# Patient Record
Sex: FEMALE | Race: ASIAN | Employment: PART TIME | URBAN - METROPOLITAN AREA
[De-identification: names, ages, dates, MRNs, and addresses within clinical notes are randomized per-mention and may not be internally consistent; named-entity substitution may affect disease eponyms.]

---

## 2021-07-31 ENCOUNTER — HOSPITAL ENCOUNTER (EMERGENCY)
Age: 31
Discharge: HOME | End: 2021-08-01
Attending: EMERGENCY MEDICINE
Payer: COMMERCIAL

## 2021-07-31 VITALS
WEIGHT: 135 LBS | SYSTOLIC BLOOD PRESSURE: 132 MMHG | HEART RATE: 90 BPM | TEMPERATURE: 98.5 F | DIASTOLIC BLOOD PRESSURE: 89 MMHG | OXYGEN SATURATION: 100 % | RESPIRATION RATE: 16 BRPM

## 2021-07-31 DIAGNOSIS — S06.0X1A CONCUSSION WITH LOSS OF CONSCIOUSNESS OF 30 MINUTES OR LESS, INITIAL ENCOUNTER: ICD-10-CM

## 2021-07-31 DIAGNOSIS — S09.90XA CLOSED HEAD INJURY, INITIAL ENCOUNTER: Primary | ICD-10-CM

## 2021-07-31 PROCEDURE — 84703 CHORIONIC GONADOTROPIN ASSAY: CPT

## 2021-07-31 PROCEDURE — 96374 THER/PROPH/DIAG INJ IV PUSH: CPT

## 2021-07-31 PROCEDURE — 6360000002 HC RX W HCPCS: Performed by: EMERGENCY MEDICINE

## 2021-07-31 PROCEDURE — 82077 ASSAY SPEC XCP UR&BREATH IA: CPT

## 2021-07-31 PROCEDURE — 80307 DRUG TEST PRSMV CHEM ANLYZR: CPT

## 2021-07-31 PROCEDURE — 2580000003 HC RX 258: Performed by: EMERGENCY MEDICINE

## 2021-07-31 PROCEDURE — 99283 EMERGENCY DEPT VISIT LOW MDM: CPT

## 2021-07-31 PROCEDURE — 36415 COLL VENOUS BLD VENIPUNCTURE: CPT

## 2021-07-31 PROCEDURE — 80053 COMPREHEN METABOLIC PANEL: CPT

## 2021-07-31 PROCEDURE — 85025 COMPLETE CBC W/AUTO DIFF WBC: CPT

## 2021-07-31 PROCEDURE — 81001 URINALYSIS AUTO W/SCOPE: CPT

## 2021-07-31 RX ORDER — LORAZEPAM 2 MG/ML
1 INJECTION INTRAMUSCULAR ONCE
Status: COMPLETED | OUTPATIENT
Start: 2021-07-31 | End: 2021-07-31

## 2021-07-31 RX ORDER — SODIUM CHLORIDE 9 MG/ML
1000 INJECTION, SOLUTION INTRAVENOUS CONTINUOUS
Status: DISCONTINUED | OUTPATIENT
Start: 2021-07-31 | End: 2021-08-01 | Stop reason: HOSPADM

## 2021-07-31 RX ADMIN — LORAZEPAM 1 MG: 2 INJECTION INTRAMUSCULAR; INTRAVENOUS at 23:56

## 2021-07-31 RX ADMIN — SODIUM CHLORIDE 1000 ML: 9 INJECTION, SOLUTION INTRAVENOUS at 23:55

## 2021-08-01 ENCOUNTER — APPOINTMENT (OUTPATIENT)
Dept: CT IMAGING | Age: 31
End: 2021-08-01
Payer: COMMERCIAL

## 2021-08-01 LAB
A/G RATIO: 1.4 (ref 1.1–2.2)
ALBUMIN SERPL-MCNC: 4.8 G/DL (ref 3.4–5)
ALP BLD-CCNC: 70 U/L (ref 40–129)
ALT SERPL-CCNC: 23 U/L (ref 10–40)
AMPHETAMINE SCREEN, URINE: NORMAL
ANION GAP SERPL CALCULATED.3IONS-SCNC: 13 MMOL/L (ref 3–16)
AST SERPL-CCNC: 23 U/L (ref 15–37)
BACTERIA: ABNORMAL /HPF
BARBITURATE SCREEN URINE: NORMAL
BASOPHILS ABSOLUTE: 0.1 K/UL (ref 0–0.2)
BASOPHILS RELATIVE PERCENT: 0.6 %
BENZODIAZEPINE SCREEN, URINE: NORMAL
BILIRUB SERPL-MCNC: <0.2 MG/DL (ref 0–1)
BILIRUBIN URINE: NEGATIVE
BLOOD, URINE: ABNORMAL
BUN BLDV-MCNC: 11 MG/DL (ref 7–20)
CALCIUM SERPL-MCNC: 9.8 MG/DL (ref 8.3–10.6)
CANNABINOID SCREEN URINE: NORMAL
CHLORIDE BLD-SCNC: 104 MMOL/L (ref 99–110)
CLARITY: CLEAR
CO2: 21 MMOL/L (ref 21–32)
COCAINE METABOLITE SCREEN URINE: NORMAL
COLOR: YELLOW
CREAT SERPL-MCNC: 0.6 MG/DL (ref 0.6–1.1)
EOSINOPHILS ABSOLUTE: 0.2 K/UL (ref 0–0.6)
EOSINOPHILS RELATIVE PERCENT: 1.2 %
EPITHELIAL CELLS, UA: ABNORMAL /HPF (ref 0–5)
ETHANOL: NORMAL MG/DL (ref 0–0.08)
GFR AFRICAN AMERICAN: >60
GFR NON-AFRICAN AMERICAN: >60
GLOBULIN: 3.4 G/DL
GLUCOSE BLD-MCNC: 120 MG/DL (ref 70–99)
GLUCOSE URINE: NEGATIVE MG/DL
HCG QUALITATIVE: NEGATIVE
HCT VFR BLD CALC: 39.6 % (ref 36–48)
HEMOGLOBIN: 13.3 G/DL (ref 12–16)
KETONES, URINE: NEGATIVE MG/DL
LEUKOCYTE ESTERASE, URINE: ABNORMAL
LYMPHOCYTES ABSOLUTE: 1.7 K/UL (ref 1–5.1)
LYMPHOCYTES RELATIVE PERCENT: 12.3 %
Lab: NORMAL
MCH RBC QN AUTO: 28.9 PG (ref 26–34)
MCHC RBC AUTO-ENTMCNC: 33.6 G/DL (ref 31–36)
MCV RBC AUTO: 86 FL (ref 80–100)
METHADONE SCREEN, URINE: NORMAL
MICROSCOPIC EXAMINATION: YES
MONOCYTES ABSOLUTE: 0.6 K/UL (ref 0–1.3)
MONOCYTES RELATIVE PERCENT: 4.3 %
NEUTROPHILS ABSOLUTE: 11.6 K/UL (ref 1.7–7.7)
NEUTROPHILS RELATIVE PERCENT: 81.6 %
NITRITE, URINE: NEGATIVE
OPIATE SCREEN URINE: NORMAL
OXYCODONE URINE: NORMAL
PDW BLD-RTO: 13.2 % (ref 12.4–15.4)
PH UA: 7
PH UA: 7 (ref 5–8)
PHENCYCLIDINE SCREEN URINE: NORMAL
PLATELET # BLD: 332 K/UL (ref 135–450)
PMV BLD AUTO: 8.8 FL (ref 5–10.5)
POTASSIUM REFLEX MAGNESIUM: 4.5 MMOL/L (ref 3.5–5.1)
PROPOXYPHENE SCREEN: NORMAL
PROTEIN UA: NEGATIVE MG/DL
RBC # BLD: 4.61 M/UL (ref 4–5.2)
RBC UA: ABNORMAL /HPF (ref 0–4)
RENAL EPITHELIAL, UA: ABNORMAL /HPF (ref 0–1)
SODIUM BLD-SCNC: 138 MMOL/L (ref 136–145)
SPECIFIC GRAVITY UA: 1.01 (ref 1–1.03)
TOTAL PROTEIN: 8.2 G/DL (ref 6.4–8.2)
URINE TYPE: ABNORMAL
UROBILINOGEN, URINE: 0.2 E.U./DL
WBC # BLD: 14.1 K/UL (ref 4–11)
WBC UA: ABNORMAL /HPF (ref 0–5)

## 2021-08-01 PROCEDURE — 70450 CT HEAD/BRAIN W/O DYE: CPT

## 2021-08-01 PROCEDURE — 6360000002 HC RX W HCPCS: Performed by: EMERGENCY MEDICINE

## 2021-08-01 PROCEDURE — 96375 TX/PRO/DX INJ NEW DRUG ADDON: CPT

## 2021-08-01 PROCEDURE — 6370000000 HC RX 637 (ALT 250 FOR IP): Performed by: EMERGENCY MEDICINE

## 2021-08-01 RX ORDER — ACETAMINOPHEN 500 MG
1000 TABLET ORAL ONCE
Status: COMPLETED | OUTPATIENT
Start: 2021-08-01 | End: 2021-08-01

## 2021-08-01 RX ORDER — PROMETHAZINE HYDROCHLORIDE 25 MG/ML
12.5 INJECTION, SOLUTION INTRAMUSCULAR; INTRAVENOUS ONCE
Status: COMPLETED | OUTPATIENT
Start: 2021-08-01 | End: 2021-08-01

## 2021-08-01 RX ADMIN — ACETAMINOPHEN 1000 MG: 500 TABLET ORAL at 01:48

## 2021-08-01 RX ADMIN — PROMETHAZINE HYDROCHLORIDE 12.5 MG: 25 INJECTION INTRAMUSCULAR; INTRAVENOUS at 01:47

## 2021-08-01 ASSESSMENT — PAIN SCALES - GENERAL: PAINLEVEL_OUTOF10: 8

## 2021-08-01 NOTE — ED NOTES
Per front registration pt was crying at time of arrival but able to use cell phone and hold conversation with family.        Ellie Mayfield RN  07/31/21 4853

## 2021-08-01 NOTE — ED NOTES
Bed: 31  Expected date:   Expected time:   Means of arrival:   Comments:     Inocente Leos RN  07/31/21 4747

## 2021-08-01 NOTE — ED PROVIDER NOTES
Services:     Active Member of Clubs or Organizations:     Attends Club or Organization Meetings:     Marital Status:    Intimate Partner Violence:     Fear of Current or Ex-Partner:     Emotionally Abused:     Physically Abused:     Sexually Abused:      Current Facility-Administered Medications   Medication Dose Route Frequency Provider Last Rate Last Admin    promethazine (PHENERGAN) injection 12.5 mg  12.5 mg Intravenous Once Moris Pastor MD        acetaminophen (TYLENOL) tablet 1,000 mg  1,000 mg Oral Once Moris Pastor MD        0.9 % sodium chloride infusion  1,000 mL Intravenous Continuous Moris Pastor  mL/hr at 07/31/21 2355 1,000 mL at 07/31/21 2355     No current outpatient medications on file. Not on File    REVIEW OF SYSTEMS  10 systems reviewed, pertinent positives per HPI otherwise noted to be negative. PHYSICAL EXAM  /89   Pulse 90   Temp 98.5 °F (36.9 °C)   Resp 16   Wt 135 lb (61.2 kg)   SpO2 100%   GENERAL APPEARANCE: Awake and alert. Cooperative. Patient is anxious, tearful. HEAD: Normocephalic. Mild swelling to the right parietal region with tenderness and a superficial abrasion. EYES: PERRL. EOM's grossly intact. No abnormal nystagmus. ENT: Mucous membranes are moist.   NECK: Supple, trachea midline. HEART: RRR. Normal S1, S2. No murmurs, rubs or gallops. LUNGS: Respirations unlabored. CTAB. Good air exchange. No wheezes, rales, or rhonchi. Speaking comfortably in full sentences. ABDOMEN: Soft. Non-distended. Non-tender. No guarding or rebound. Normal Bowel sounds. EXTREMITIES: No peripheral edema. MAEE. No acute deformities. SKIN: Warm and dry. No acute rashes. NEUROLOGICAL: Alert and interactive. Patient slow in her responses. CN II-XII intact. No gross facial drooping. Strength 5/5, sensation intact. No pronator drift. Normal coordination. PSYCHIATRIC: Anxious, tearful.     LABS  I have reviewed all labs for this visit.    Results for orders placed or performed during the hospital encounter of 07/31/21   CBC Auto Differential   Result Value Ref Range    WBC 14.1 (H) 4.0 - 11.0 K/uL    RBC 4.61 4.00 - 5.20 M/uL    Hemoglobin 13.3 12.0 - 16.0 g/dL    Hematocrit 39.6 36.0 - 48.0 %    MCV 86.0 80.0 - 100.0 fL    MCH 28.9 26.0 - 34.0 pg    MCHC 33.6 31.0 - 36.0 g/dL    RDW 13.2 12.4 - 15.4 %    Platelets 272 334 - 298 K/uL    MPV 8.8 5.0 - 10.5 fL    Neutrophils % 81.6 %    Lymphocytes % 12.3 %    Monocytes % 4.3 %    Eosinophils % 1.2 %    Basophils % 0.6 %    Neutrophils Absolute 11.6 (H) 1.7 - 7.7 K/uL    Lymphocytes Absolute 1.7 1.0 - 5.1 K/uL    Monocytes Absolute 0.6 0.0 - 1.3 K/uL    Eosinophils Absolute 0.2 0.0 - 0.6 K/uL    Basophils Absolute 0.1 0.0 - 0.2 K/uL   Comprehensive Metabolic Panel w/ Reflex to MG   Result Value Ref Range    Sodium 138 136 - 145 mmol/L    Potassium reflex Magnesium 4.5 3.5 - 5.1 mmol/L    Chloride 104 99 - 110 mmol/L    CO2 21 21 - 32 mmol/L    Anion Gap 13 3 - 16    Glucose 120 (H) 70 - 99 mg/dL    BUN 11 7 - 20 mg/dL    CREATININE 0.6 0.6 - 1.1 mg/dL    GFR Non-African American >60 >60    GFR African American >60 >60    Calcium 9.8 8.3 - 10.6 mg/dL    Total Protein 8.2 6.4 - 8.2 g/dL    Albumin 4.8 3.4 - 5.0 g/dL    Albumin/Globulin Ratio 1.4 1.1 - 2.2    Total Bilirubin <0.2 0.0 - 1.0 mg/dL    Alkaline Phosphatase 70 40 - 129 U/L    ALT 23 10 - 40 U/L    AST 23 15 - 37 U/L    Globulin 3.4 g/dL   HCG Qualitative, Serum   Result Value Ref Range    hCG Qual Negative Detects HCG level >10 MIU/mL   Urinalysis, reflex to microscopic   Result Value Ref Range    Urine Type NotGiven    Urine Drug Screen   Result Value Ref Range    Amphetamine Screen, Urine Neg Negative <1000ng/mL    Barbiturate Screen, Ur Neg Negative <200 ng/mL    Benzodiazepine Screen, Urine Neg Negative <200 ng/mL    Cannabinoid Scrn, Ur Neg Negative <50 ng/mL    Cocaine Metabolite Screen, Urine Neg Negative <300 ng/mL    Opiate Scrn, Ur Neg Negative <300 ng/mL    PCP Screen, Urine Neg Negative <25 ng/mL    Methadone Screen, Urine Neg Negative <300 ng/mL    Propoxyphene Scrn, Ur Neg Negative <300 ng/mL    Oxycodone Urine Neg Negative <100 ng/ml    Drug Screen Comment: see below    Ethanol   Result Value Ref Range    Ethanol Lvl None Detected mg/dL       RADIOLOGY  X-RAYS:  I have reviewed radiologic plain film image(s). ALL OTHER NON-PLAIN FILM IMAGES SUCH AS CT, ULTRASOUND AND MRI HAVE BEEN READ BY THE RADIOLOGIST. CT Head WO Contrast   Final Result   No acute intracranial abnormality. Rechecks: Physical assessment performed. Appears more comfortable on reevaluation. ED COURSE/MDM  Patient seen and evaluated. Old records reviewed. Labs and imaging reviewed and results discussed with patient. Patient with closed head injury. She has some swelling and abrasion of the right parietal region. She does appear to have a concussion as well as poorly controlled anxiety. She is much more calm after Ativan. She would like to go home. Plan for outpatient follow-up with her PCP as well as neurology. Advised to return with any concerns. Patient's friend acted as  during the encounter. New Prescriptions    No medications on file       CLINICAL IMPRESSION  1. Closed head injury, initial encounter    2. Concussion with loss of consciousness of 30 minutes or less, initial encounter        Blood pressure 132/89, pulse 90, temperature 98.5 °F (36.9 °C), resp. rate 16, weight 135 lb (61.2 kg), SpO2 100 %. Hakeem Parish was discharged to home in stable condition.         Balta Metzger MD  08/01/21 0931

## 2021-08-05 ENCOUNTER — HOSPITAL ENCOUNTER (EMERGENCY)
Age: 31
Discharge: HOME | End: 2021-08-05
Payer: COMMERCIAL

## 2021-08-05 ENCOUNTER — APPOINTMENT (OUTPATIENT)
Dept: CT IMAGING | Age: 31
End: 2021-08-05
Payer: COMMERCIAL

## 2021-08-05 VITALS
TEMPERATURE: 98 F | OXYGEN SATURATION: 100 % | RESPIRATION RATE: 16 BRPM | DIASTOLIC BLOOD PRESSURE: 72 MMHG | SYSTOLIC BLOOD PRESSURE: 110 MMHG | HEART RATE: 72 BPM

## 2021-08-05 DIAGNOSIS — S16.1XXA CERVICAL STRAIN, ACUTE, INITIAL ENCOUNTER: ICD-10-CM
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
## 2021-08-05 DIAGNOSIS — S09.90XA CLOSED HEAD INJURY, INITIAL ENCOUNTER: Primary | ICD-10-CM

## 2021-08-05 LAB — HCG(URINE) PREGNANCY TEST: NEGATIVE

## 2021-08-05 PROCEDURE — 84703 CHORIONIC GONADOTROPIN ASSAY: CPT

## 2021-08-05 PROCEDURE — 6360000002 HC RX W HCPCS: Performed by: NURSE PRACTITIONER

## 2021-08-05 PROCEDURE — 72125 CT NECK SPINE W/O DYE: CPT

## 2021-08-05 PROCEDURE — 99284 EMERGENCY DEPT VISIT MOD MDM: CPT

## 2021-08-05 PROCEDURE — 96372 THER/PROPH/DIAG INJ SC/IM: CPT

## 2021-08-05 RX ORDER — METHOCARBAMOL 500 MG/1
500 TABLET, FILM COATED ORAL 3 TIMES DAILY
Qty: 30 TABLET | Refills: 0 | Status: SHIPPED | OUTPATIENT
Start: 2021-08-05 | End: 2021-08-15

## 2021-08-05 RX ORDER — KETOROLAC TROMETHAMINE 30 MG/ML
30 INJECTION, SOLUTION INTRAMUSCULAR; INTRAVENOUS ONCE
Status: DISCONTINUED | OUTPATIENT
Start: 2021-08-05 | End: 2021-08-05 | Stop reason: HOSPADM

## 2021-08-05 RX ORDER — NAPROXEN 500 MG/1
500 TABLET ORAL 2 TIMES DAILY WITH MEALS
Qty: 30 TABLET | Refills: 0 | Status: SHIPPED | OUTPATIENT
Start: 2021-08-05 | End: 2022-03-29 | Stop reason: ALTCHOICE

## 2021-08-05 RX ORDER — ORPHENADRINE CITRATE 30 MG/ML
60 INJECTION INTRAMUSCULAR; INTRAVENOUS ONCE
Status: COMPLETED | OUTPATIENT
Start: 2021-08-05 | End: 2021-08-05

## 2021-08-05 RX ADMIN — ORPHENADRINE CITRATE 60 MG: 60 INJECTION INTRAMUSCULAR; INTRAVENOUS at 17:25

## 2021-08-05 ASSESSMENT — PAIN DESCRIPTION - ORIENTATION: ORIENTATION: RIGHT;LEFT;POSTERIOR

## 2021-08-05 ASSESSMENT — PAIN DESCRIPTION - PAIN TYPE
TYPE: ACUTE PAIN
TYPE: ACUTE PAIN

## 2021-08-05 ASSESSMENT — PAIN SCALES - GENERAL
PAINLEVEL_OUTOF10: 4
PAINLEVEL_OUTOF10: 7

## 2021-08-05 ASSESSMENT — PAIN DESCRIPTION - LOCATION
LOCATION: HEAD;NECK;SHOULDER
LOCATION: NECK

## 2021-08-05 ASSESSMENT — PAIN DESCRIPTION - FREQUENCY: FREQUENCY: CONTINUOUS

## 2021-08-05 ASSESSMENT — PAIN DESCRIPTION - DESCRIPTORS: DESCRIPTORS: ACHING

## 2021-08-05 ASSESSMENT — PAIN - FUNCTIONAL ASSESSMENT: PAIN_FUNCTIONAL_ASSESSMENT: 0-10

## 2021-08-05 NOTE — ED PROVIDER NOTES
**ADVANCED PRACTICE PROVIDER, I HAVE EVALUATED THIS [facility]  ED  EMERGENCY DEPARTMENT ENCOUNTER      Pt Name: Ameena Armstrong  VIS:[id number]  [place] 1990  Date of evaluation: 8/5/2021  Provider: MIQUEL Wiggins CNP      Chief Complaint:    Chief Complaint   Patient presents with    Fall     patient was using a hoverboard and fell off 1 week ago, seen in ER at that time. Patient continues to have pain in head and neck. Nursing Notes, Past Medical Hx, Past Surgical Hx, Social Hx, Allergies, and Family Hx were all reviewed and agreed with or any disagreements were addressed in the HPI.    HPI: (Location, Duration, Timing, Severity, Quality, Assoc Sx, Context, Modifying factors)    Chief Complaint of neck pain    This is a  33 y.o. female who presents with neck pain and HA after falling off the hover board on Saturday. Patient states that she was riding a hover board when she fell backwards, hitting her head on the concrete. She still complaining of occipital headache and neck pain. States she was seen in the ED had a CT scan however, she is only been taking Tylenol for the discomfort. She has not has been doing brain rest.  Rates the pain a 7 on a 10. Denies any cough, congestion, fever or chills, no chest pain pleuritic chest pain or shortness of breath. She has not tried any additional medication for the symptoms. She denies any nausea vomiting or diarrhea. No blurred or lost vision. She denies any numbness tingling or paresthesias. No saddle anesthesia, loss of bowel bladder control urinary retention. No additional complaints. Patient presents awake, alert and in no acute respiratory stress or toxic appearance. PastMedical/Surgical History:  No past medical history on file. No past surgical history on file.     Medications:  Discharge Medication List as of 8/5/2021  5:06 PM            Review of Systems:  (2-9 systems needed)  Review of Systems   Constitutional: Negative for chills and fever. HENT: Negative for congestion and sore throat. Respiratory: Negative for cough, shortness of breath and wheezing. Cardiovascular: Negative for chest pain. Gastrointestinal: Negative for abdominal pain, diarrhea, nausea and vomiting. Genitourinary: Negative for difficulty urinating. Musculoskeletal: Positive for neck pain. Negative for back pain. She states she had a headache and neck pain since the injury. States she was seen in the ED had a CT scan however, she is only been taking Tylenol for the discomfort. She has not has been doing brain rest.    Skin: Negative for color change. Neurological: Positive for headaches. Negative for weakness and numbness. Patient complains of neck pain and HA after falling off the hover board on Saturday. Patient states that she was riding a hover board when she fell backwards, hitting her head on the concrete. She still complaining of occipital headache and neck pain. Denies any numbness tingling or paresthesias. No saddle anesthesia, no loss of bowel bladder control urinary retention. \"Positives and Pertinent negatives as per HPI\"    Physical Exam:  Physical Exam  Vitals and nursing note reviewed. Constitutional:       Appearance: She is well-developed. She is not diaphoretic. HENT:      Head: Normocephalic. Right Ear: External ear normal.      Left Ear: External ear normal.   Eyes:      General: No scleral icterus. Right eye: No discharge. Left eye: No discharge. Neck:      Comments: Patient has no central cervical thoracic or lumbar spine tenderness or step-off. Normal flexion and extension of head and neck, no nuchal rigidity or meningeal irritation. However she does have reproducible tenderness to the right trapezius muscle that extends around her right scapula, it all appears to be musculoskeletal in nature.   Cardiovascular:      Rate and Rhythm: Normal rate. Pulmonary:      Effort: Pulmonary effort is normal. No respiratory distress. Breath sounds: Normal breath sounds. Abdominal:      Palpations: Abdomen is soft. Musculoskeletal:         General: Normal range of motion. Cervical back: Normal range of motion and neck supple. Skin:     General: Skin is warm. Capillary Refill: Capillary refill takes less than 2 seconds. Coloration: Skin is not pale. Neurological:      General: No focal deficit present. Mental Status: She is alert and oriented to person, place, and time. GCS: GCS eye subscore is 4. GCS verbal subscore is 5. GCS motor subscore is 6. Cranial Nerves: Cranial nerves are intact. Sensory: Sensation is intact. Motor: Motor function is intact. Comments: Although she is complaining of a headache, she denies worse headache of life. She is unremarkable neurological exam with no acute focal deficits. Psychiatric:         Behavior: Behavior normal.         MEDICAL DECISION MAKING    Vitals:    Vitals:    08/05/21 1413 08/05/21 1732   BP: 106/74 110/72   Pulse: 76 72   Resp: 16 16   Temp: 98 °F (36.7 °C)    TempSrc: Oral    SpO2: 99% 100%       LABS:  Labs Reviewed   PREGNANCY, URINE    Narrative:     Performed at:  [facility]) [address], [address] [place] [address]   Phone  of labs reviewed and were negative at this time or not returned at the time of this note. RADIOLOGY:   Non-plain film images such as CT, Ultrasound and MRI are read by the radiologist. Kay CASTELLANO APRN - CNP have directly visualized the radiologic plain film image(s) with the below findings:      Interpretation per the Radiologist below, if available at the time of this note:    CT CERVICAL SPINE WO CONTRAST   Final Result   1. No acute fracture.                 MEDICAL DECISION MAKING / ED COURSE:      PROCEDURES:   Procedures    None    Patient was given: Medications   orphenadrine (NORFLEX) injection 60 mg (60 mg Intramuscular Given 8/5/21 9185)       Patient complains of neck pain and HA after falling off the hover board on Saturday. Patient states that she was riding a hover board when she fell backwards, hitting her head on the concrete. She still complaining of occipital headache and neck pain. States she was seen in the ED had a CT scan however, she is only been taking Tylenol for the discomfort. She has not has been doing brain rest.    After evaluation and examination the patient I did evaluate the patient's chart, when she was injured she had a CT scan of the head which was negative. Today I did a CT cervical spine however, I do believe it will be unremarkable, she was given Toradol Norflex for pain. However, only took the muscle relaxer. Her CT scan shows no acute fracture or subluxation. However, The history and physical exam suggests a soft tissue source for pain such as muscles, tendons, nerve irritation, etc. I estimate there is LOW risk for CAUDA EQUINA or CENTRAL CORD SYNDROME, EPIDURAL MASS LESION OR ABSCESS, ARTERIAL DISSECTION, MENINGITIS, FRACTURE, CORD COMPRESSION, OR SEVERE SPINAL STENOSIS,  thus I consider the discharge disposition reasonable.      , Shared medical decision was made to the patient myself we agreed the patient could be discharged home with outpatient follow-up. Patient is advised to follow-up with their family doctor within the next 24-48 hours and return to the emergency department with any concerns. She was discharged home with anti-inflammatories and muscle relaxers. Educated take medicine as prescribed. Return to the ER for any worsening or concerning symptoms. The patient tolerated their visit well. I evaluated the patient. The physician was available for consultation as needed.   The patient and / or the family were informed of the results of any tests, a time was given to answer questions, a plan was proposed and they agreed with plan. Patient verbalized understanding of discharge instructions and was discharged in the department in stable condition. CLINICAL IMPRESSION:  1. Closed head injury, initial encounter    2. Fall, initial encounter    3.  Cervical strain, acute, initial encounter        DISPOSITION Decision To Discharge 08/05/2021 05:05:26 PM      PATIENT REFERRED TO:  MIQUEL Putnam CNP  [address]  [phone]    In 2 days  Follow-up with the family doctor in the next 2 to 3 days for reevaluation    [facility]  [address]  Go to   If symptoms worsen      DISCHARGE MEDICATIONS:  Discharge Medication List as of 8/5/2021  5:06 PM      START taking these medications    Details   naproxen (NAPROSYN) 500 MG tablet Take 1 tablet by mouth 2 times daily (with meals), Disp-30 tablet, R-0Print      methocarbamol (ROBAXIN) 500 MG tablet Take 1 tablet by mouth 3 times daily for 10 days, Disp-30 tablet, R-0Print             DISCONTINUED MEDICATIONS:  Discharge Medication List as of 8/5/2021  5:06 PM                 (Please note the MDM and HPI sections of this note were completed with a voice recognition program.  Efforts were made to edit the dictations but occasionally words are mis-transcribed.)    Electronically signed, MIQUEL Wiggins CNP,   '     MIQUEL Wiggins CNP  08/08/21 5579

## 2021-08-05 NOTE — ED NOTES

## 2021-08-08 ASSESSMENT — ENCOUNTER SYMPTOMS
ABDOMINAL PAIN: 0
COUGH: 0
NAUSEA: 0
VOMITING: 0
DIARRHEA: 0
SHORTNESS OF BREATH: 0
BACK PAIN: 0
COLOR CHANGE: 0
WHEEZING: 0
SORE THROAT: 0

## 2022-02-28 ENCOUNTER — NURSE TRIAGE (OUTPATIENT)
Dept: OTHER | Facility: CLINIC | Age: 32
End: 2022-02-28

## 2022-02-28 NOTE — TELEPHONE ENCOUNTER
Received call from Audrey Reed at [facility] with [employer]. Subjective: Caller states \"follow to ER visit\"     Current Symptoms: Rosenda Laureano 6 months ago and struck head had + LOC at time. Had f/u appointment scheduled but no  available. Feeling better but is having pain on right side of head that comes and goes since the fall. Lasts 2-5 minutes, no dizziness associated with pain, only dizzy if she does not eat    Onset: 6 months ago; gradual, intermittent    Associated Symptoms: NA    Pain Severity: 9/10; squeezing; intermittent    Temperature: denies     What has been tried: Advil    LMP: NA Pregnant: NA    Recommended disposition: See in Office Within 2 Weeks    Care advice provided, patient verbalizes understanding; denies any other questions or concerns; instructed to call back for any new or worsening symptoms. Patient/Caller agrees with recommended disposition; writer provided warm transfer to Tangela Bhat at [facility] for appointment scheduling     Attention Provider: Thank you for allowing me to participate in the care of your patient. The patient was connected to triage in response to information provided to the ECC/PSC. Please do not respond through this encounter as the response is not directed to a shared pool.           Reason for Disposition   Patient wants to be seen   TBI symptoms not improving (e.g., \"not feeling any better\")    Protocols used: RECENT MEDICAL VISIT FOR INJURY FOLLOW-UP CALL-ADULT-[place], TRAUMATIC BRAIN INJURY MORE THAN 14 DAYS AGO FOLLOW-UP CALL-ADULT-[place]

## 2022-03-28 NOTE — PROGRESS NOTES
[address] and [facility]                                             [address] [facility] [address], [address] [facility] [address] [facility] [place], [facility] [address]        Phone: [phone]                                     Name:  Felicita Morris  :    1990      Consultants:   Patient Care Team:  Milvia Chawla DO as PCP - General (Family Medicine)    Chief Complaint:     Felicita Morris is a 27 y.o. female  who presents today for a New Patient care visit with Personalized Prevention Plan Services as noted below. HPI:     Due to language barrier, an  was present via phone during the history-taking and subsequent discussion (and for part of the physical exam) with this patient. Felicita Morris 27 y.o. female with hx of seasonal allergies. Today her concerns are swelling under her eyes, cough, joint pain and headaches. She was diagnosed with allergies 4 years ago. She never had allergies until she moved to [place]. In the last few weeks she has noticed swelling under her eyes every day, worse in the morning. She has also been experiencing cough, itchy throat, difficulty breathing, sinus pressure, and congestion. No itchy or watery eyes. No rhinorrhea. She takes claritin and zyrtec. She has been taking these for multiple months. She feels that they help with her cough somewhat but do not fully relieve her symptoms and have not improved her under eye swelling. Her cough is exacerbated by perfumes and flowers. Cough is constant and does not change throughout the day or night. Sometimes her cough is productive, yellow sputum, but usually it is a dry cough. No hemoptysis. She has noticed that she wheezes after coughing fits and the wheezing has gotten worse. She has also been experiencing night sweats and chills. No unexplained weight loss. She does not recall if she was vaccinated for TB or if she has ever been tested.  No symptoms of cough or SOB as a child. Patient also has pain in her joints and states that she was diagnosed with osteochondritis as a child. Her wrists and knees are most painful. She has pain in her wrists while driving. No specific activity exacerbates her knee pain. She describes the pain as a constant ache. She has not tried any medication. She has pain at rest.     She was seen in the ED   She fell and hit her head 7 mo ago and was seen in the  ED. She had a CT of her head and cervical spine which was negative. She continues to experience headaches every night. These headaches prevent her from sleeping. She describes the headache as a tight feeling in her temples. Advil helps some. She also has neck stiffness. No visual changes. No dizziness. No aura. No nausea. There is no problem list on file for this patient. Past Medical History:    History reviewed. No pertinent past medical history. Past Surgical History:  History reviewed. No pertinent surgical history. Home Meds:  Prior to Visit Medications    Medication Sig Taking? Authorizing Provider   cetirizine (ZYRTEC) 10 MG tablet Take 10 mg by mouth daily Yes Historical Provider, MD   fluticasone (FLONASE) 50 MCG/ACT nasal spray 2 sprays by Each Nostril route daily Yes Radha PERAZA May, DO   loratadine (CLARITIN) 10 MG tablet Take 1 tablet by mouth daily Yes Radha PERAZA May, DO       Allergies:    Patient has no known allergies.     Family History:       Problem Relation Age of Onset    No Known Problems Mother     No Known Problems Father        Social History:  Social History    None            Health Maintenance Completed:  Health Maintenance   Topic Date Due    Varicella vaccine (1 of 2 - 2-dose childhood series) Never done    HIV screen  Never done    DTaP/Tdap/Td vaccine (1 - Tdap) Never done    Cervical cancer screen  Never done    Flu vaccine (1) 09/01/2021    COVID-19 Vaccine (3 - Booster for Pfizer series) 12/26/2021    Depression Screen  03/29/2023    Hepatitis C screen  Completed    Hepatitis A vaccine  Aged Out    Hepatitis B vaccine  Aged Out    Hib vaccine  Aged Out    Meningococcal (ACWY) vaccine  Aged Out    Pneumococcal 0-64 years Vaccine  Aged [employer] History   Administered Date(s) Administered    COVID-19, Pfizer Purple top, DILUTE for use, 12+ yrs, 30mcg/0.3mL dose 06/30/2021, 07/26/2021         Review of Systems:  Review of Systems   Constitutional: Negative for fatigue and fever. HENT: Positive for facial swelling and sinus pressure. Negative for hearing loss, postnasal drip, rhinorrhea, sinus pain and sneezing. Eyes: Negative for pain and itching. Respiratory: Positive for cough and shortness of breath. Cardiovascular: Negative for chest pain and palpitations. Gastrointestinal: Negative for abdominal distention, abdominal pain, diarrhea, nausea and vomiting. Musculoskeletal: Positive for arthralgias and neck pain. Negative for myalgias. Neurological: Negative for dizziness, seizures, syncope and light-headedness. Psychiatric/Behavioral: Negative for agitation and behavioral problems. Physical Exam:   Vitals:    03/29/22 0825   BP: 98/62   Site: Left Upper Arm   Position: Sitting   Cuff Size: Medium Adult   Pulse: 83   Resp: 18   Temp: 98.1 °F (36.7 °C)   TempSrc: Infrared   SpO2: 97%   Weight: 128 lb 12.8 oz (58.4 kg)   Height: 5' 5\" (1.651 m)     Body mass index is 21.43 kg/m². Wt Readings from Last 3 Encounters:   03/29/22 128 lb 12.8 oz (58.4 kg)   07/31/21 135 lb (61.2 kg)       BP Readings from Last 3 Encounters:   03/29/22 98/62   08/05/21 110/72   07/31/21 132/89       Physical Exam  Constitutional:       Appearance: Normal appearance. HENT:      Head: Normocephalic and atraumatic. Nose: Nose normal.      Comments: No tenderness to palpation over maxillary or temporal sinus  Eyes:      Extraocular Movements: Extraocular movements intact.       Conjunctiva/sclera: Conjunctivae normal.      Pupils: Pupils are equal, round, and reactive to light. Comments: Mild edema, bilateral lower lids, no discoloration. No pain with eye movement. No tenderness to palpation   Cardiovascular:      Rate and Rhythm: Normal rate and regular rhythm. Pulses: Normal pulses. Heart sounds: Normal heart sounds. Pulmonary:      Effort: Pulmonary effort is normal.      Breath sounds: Wheezing present. No rhonchi. Abdominal:      General: Abdomen is flat. Bowel sounds are normal. There is no distension. Palpations: Abdomen is soft. Musculoskeletal:         General: No swelling, tenderness or deformity. Comments: No tenderness to palpation of the joints of the hand, wrist, or knee. No pain with passive or active range or motion. Skin:     General: Skin is warm and dry. Capillary Refill: Capillary refill takes less than 2 seconds. Neurological:      General: No focal deficit present. Mental Status: She is alert and oriented to person, place, and time. Psychiatric:         Mood and Affect: Mood normal.         Behavior: Behavior normal.              Lab Review:   Lab Results   Component Value Date     03/29/2022     07/31/2021    K 4.4 03/29/2022    K 4.5 07/31/2021    CO2 23 03/29/2022    CO2 21 07/31/2021    BUN 14 03/29/2022    BUN 11 07/31/2021    CREATININE 0.5 03/29/2022    CREATININE 0.6 07/31/2021    GLUCOSE 93 03/29/2022    GLUCOSE 120 07/31/2021    CALCIUM 9.3 03/29/2022    CALCIUM 9.8 07/31/2021     Lab Results   Component Value Date    WBC 8.4 03/29/2022    WBC 14.1 07/31/2021    HGB 14.0 03/29/2022    HGB 13.3 07/31/2021    HCT 42.0 03/29/2022    HCT 39.6 07/31/2021    MCV 86.7 03/29/2022    MCV 86.0 07/31/2021     03/29/2022     07/31/2021        Assessment/Plan:  [facility] 27 y.o. female with hx of seasonal allergies.  Today the following concerns were addressed:    Wheezing and cough, not well controlled  Likely related to allergies but there may be a component of reactive airway disease vs asthma. Less likely TB but as patient is experiencing cough and night sweats and has lived outside the [address] [place] [address] [address] it cannot be ruled out. - Bronchial Challenge; PFTs, ordered  - XR CHEST STANDARD (2 VW), ordered due to unknown vaccination status for TB  - Quantiferon, Incubated; ordered  - Consider acid fast bacilli if quantiferon positive and cxr concerning  - Follow up in 1 mo for review PFT results    Allergies, seasonal, not well controlled  Sinus congestion and under eye swelling most likely due to seasonal allergies  - recommend that the patient take zyrtec or Claritin but not both. Patient prefers Claritin because she feels that Zyrtec makes her tired. - fluticasone (FLONASE) 50 MCG/ACT nasal spray; 2 sprays by Each Nostril route daily  - loratadine (CLARITIN) 10 MG tablet; Take 1 tablet by mouth daily    Headache and Cervical pain (neck), not well managed  Likely tension headache related to hypertonic cervical muscles vs posture. DDx. Whip lash injury after falling off hover board  - [address]    Joint pain, unclear etiology  - Patient may benefit from physical therapy   - Further work up at follow up appointment  - Recommend NSAIDs    Healthcare maintenance  - CBC with Auto Differential; Future  - Comprehensive Metabolic Panel; Future  - Hepatitis C Antibody; Future  - HIV Screen;  Future  - Recommend scheduling pap smear  - Patient declined immunizations today    Health Maintenance Due:  Health Maintenance Due   Topic Date Due    Varicella vaccine (1 of 2 - 2-dose childhood series) Never done    HIV screen  Never done    DTaP/Tdap/Td vaccine (1 - Tdap) Never done    Cervical cancer screen  Never done    Flu vaccine (1) 09/01/2021    COVID-19 Vaccine (3 - Booster for Pfizer series) 12/26/2021        Health care decision maker:  <72years old      Health Maintenance: (USPSTF Recommendations)  (F) Breast Cancer Screen: (40-49 (C), 50-74 biennial screening mammogram (B))  (F) Cervical Cancer Screen: (21-29 q3yr cytology alone; 30-65 q3yr cytology alone, q5yr with hrHPV alone, or q5yr cytology+hrHPV (A))  CRC/Colonoscopy Screening: (adults 45-49 (B), 50-75 (A))  Lung Ca Screening: Annual LDCT (+smoker age 49-80, smoked within 15 years, total of 20 pack yr history (B)):  DEXA Screen: (women >65 and older, <65 if at risk/postmenopausal (B))  HIV Screen: (16-65 yr old, and all pregnant patients (A)): Hep C Screen: (18-79 yr old (B)):  HCC Screen: (all pts with cirrhosis and high risk Hep B (US q6 mo)):  Immunizations:    RTC:   Return in about 4 weeks (around 4/26/2022) for respiratory symptom work up. EMR Dragon/transcription disclaimer:  Much of this encounter note is electronic transcription/translation of spoken language to printed texts. The electronic translation of spoken language may be erroneous, or at times, nonsensical words or phrases may be inadvertently transcribed.   Although I have reviewed the note for such errors, some may still exist.

## 2022-03-29 ENCOUNTER — HOSPITAL ENCOUNTER (OUTPATIENT)
Age: 32
Discharge: HOME | End: 2022-03-29
Payer: COMMERCIAL

## 2022-03-29 ENCOUNTER — HOSPITAL ENCOUNTER (OUTPATIENT)
Dept: GENERAL RADIOLOGY | Age: 32
Discharge: HOME | End: 2022-03-29
Payer: COMMERCIAL

## 2022-03-29 ENCOUNTER — OFFICE VISIT (OUTPATIENT)
Dept: PRIMARY CARE CLINIC | Age: 32
End: 2022-03-29
Payer: COMMERCIAL

## 2022-03-29 VITALS
RESPIRATION RATE: 18 BRPM | HEIGHT: 65 IN | HEART RATE: 83 BPM | BODY MASS INDEX: 21.46 KG/M2 | DIASTOLIC BLOOD PRESSURE: 62 MMHG | OXYGEN SATURATION: 97 % | WEIGHT: 128.8 LBS | SYSTOLIC BLOOD PRESSURE: 98 MMHG | TEMPERATURE: 98.1 F

## 2022-03-29 DIAGNOSIS — R06.2 WHEEZING: ICD-10-CM

## 2022-03-29 DIAGNOSIS — R09.81 SINUS CONGESTION: ICD-10-CM

## 2022-03-29 DIAGNOSIS — T78.40XA ALLERGY, INITIAL ENCOUNTER: ICD-10-CM

## 2022-03-29 DIAGNOSIS — R06.2 WHEEZING: Primary | ICD-10-CM

## 2022-03-29 DIAGNOSIS — M25.532 BILATERAL WRIST PAIN: ICD-10-CM

## 2022-03-29 DIAGNOSIS — Z11.1 TUBERCULOSIS SCREENING: ICD-10-CM

## 2022-03-29 DIAGNOSIS — Z00.00 HEALTHCARE MAINTENANCE: ICD-10-CM

## 2022-03-29 DIAGNOSIS — M54.2 CERVICAL PAIN (NECK): ICD-10-CM

## 2022-03-29 DIAGNOSIS — M25.531 BILATERAL WRIST PAIN: ICD-10-CM

## 2022-03-29 LAB
A/G RATIO: 1.7 (ref 1.1–2.2)
ALBUMIN SERPL-MCNC: 4.7 G/DL (ref 3.4–5)
ALP BLD-CCNC: 70 U/L (ref 40–129)
ALT SERPL-CCNC: 18 U/L (ref 10–40)
ANION GAP SERPL CALCULATED.3IONS-SCNC: 14 MMOL/L (ref 3–16)
AST SERPL-CCNC: 17 U/L (ref 15–37)
BASOPHILS ABSOLUTE: 0 K/UL (ref 0–0.2)
BASOPHILS RELATIVE PERCENT: 0.5 %
BILIRUB SERPL-MCNC: 0.3 MG/DL (ref 0–1)
BUN BLDV-MCNC: 14 MG/DL (ref 7–20)
CALCIUM SERPL-MCNC: 9.3 MG/DL (ref 8.3–10.6)
CHLORIDE BLD-SCNC: 105 MMOL/L (ref 99–110)
CO2: 23 MMOL/L (ref 21–32)
CREAT SERPL-MCNC: 0.5 MG/DL (ref 0.6–1.1)
EOSINOPHILS ABSOLUTE: 0.3 K/UL (ref 0–0.6)
EOSINOPHILS RELATIVE PERCENT: 4 %
GFR AFRICAN AMERICAN: >60
GFR NON-AFRICAN AMERICAN: >60
GLUCOSE BLD-MCNC: 93 MG/DL (ref 70–99)
HCT VFR BLD CALC: 42 % (ref 36–48)
HEMOGLOBIN: 14 G/DL (ref 12–16)
HEPATITIS C ANTIBODY INTERPRETATION: NORMAL
LYMPHOCYTES ABSOLUTE: 1.3 K/UL (ref 1–5.1)
LYMPHOCYTES RELATIVE PERCENT: 15.6 %
MCH RBC QN AUTO: 28.9 PG (ref 26–34)
MCHC RBC AUTO-ENTMCNC: 33.3 G/DL (ref 31–36)
MCV RBC AUTO: 86.7 FL (ref 80–100)
MONOCYTES ABSOLUTE: 0.5 K/UL (ref 0–1.3)
MONOCYTES RELATIVE PERCENT: 5.6 %
NEUTROPHILS ABSOLUTE: 6.2 K/UL (ref 1.7–7.7)
NEUTROPHILS RELATIVE PERCENT: 74.3 %
PDW BLD-RTO: 14 % (ref 12.4–15.4)
PLATELET # BLD: 285 K/UL (ref 135–450)
PMV BLD AUTO: 9.4 FL (ref 5–10.5)
POTASSIUM SERPL-SCNC: 4.4 MMOL/L (ref 3.5–5.1)
RBC # BLD: 4.85 M/UL (ref 4–5.2)
SODIUM BLD-SCNC: 142 MMOL/L (ref 136–145)
TOTAL PROTEIN: 7.5 G/DL (ref 6.4–8.2)
WBC # BLD: 8.4 K/UL (ref 4–11)

## 2022-03-29 PROCEDURE — 85025 COMPLETE CBC W/AUTO DIFF WBC: CPT

## 2022-03-29 PROCEDURE — G8427 DOCREV CUR MEDS BY ELIG CLIN: HCPCS | Performed by: STUDENT IN AN ORGANIZED HEALTH CARE EDUCATION/TRAINING PROGRAM

## 2022-03-29 PROCEDURE — 80053 COMPREHEN METABOLIC PANEL: CPT

## 2022-03-29 PROCEDURE — 87390 HIV-1 AG IA: CPT

## 2022-03-29 PROCEDURE — G8420 CALC BMI NORM PARAMETERS: HCPCS | Performed by: STUDENT IN AN ORGANIZED HEALTH CARE EDUCATION/TRAINING PROGRAM

## 2022-03-29 PROCEDURE — 99204 OFFICE O/P NEW MOD 45 MIN: CPT | Performed by: STUDENT IN AN ORGANIZED HEALTH CARE EDUCATION/TRAINING PROGRAM

## 2022-03-29 PROCEDURE — 86702 HIV-2 ANTIBODY: CPT

## 2022-03-29 PROCEDURE — 86803 HEPATITIS C AB TEST: CPT

## 2022-03-29 PROCEDURE — 36415 COLL VENOUS BLD VENIPUNCTURE: CPT

## 2022-03-29 PROCEDURE — 86701 HIV-1ANTIBODY: CPT

## 2022-03-29 PROCEDURE — 1036F TOBACCO NON-USER: CPT | Performed by: STUDENT IN AN ORGANIZED HEALTH CARE EDUCATION/TRAINING PROGRAM

## 2022-03-29 PROCEDURE — 71046 X-RAY EXAM CHEST 2 VIEWS: CPT

## 2022-03-29 PROCEDURE — 86480 TB TEST CELL IMMUN MEASURE: CPT

## 2022-03-29 PROCEDURE — G8484 FLU IMMUNIZE NO ADMIN: HCPCS | Performed by: STUDENT IN AN ORGANIZED HEALTH CARE EDUCATION/TRAINING PROGRAM

## 2022-03-29 RX ORDER — LORATADINE 10 MG/1
10 TABLET ORAL DAILY
Qty: 30 TABLET | Refills: 3 | Status: SHIPPED | OUTPATIENT
Start: 2022-03-29 | End: 2022-07-25

## 2022-03-29 RX ORDER — FLUTICASONE PROPIONATE 50 MCG
2 SPRAY, SUSPENSION (ML) NASAL DAILY
Qty: 48 G | Refills: 1 | Status: SHIPPED | OUTPATIENT
Start: 2022-03-29

## 2022-03-29 RX ORDER — CETIRIZINE HYDROCHLORIDE 10 MG/1
10 TABLET ORAL DAILY
COMMUNITY

## 2022-03-29 ASSESSMENT — PATIENT HEALTH QUESTIONNAIRE - PHQ9
SUM OF ALL RESPONSES TO PHQ QUESTIONS 1-9: 0
1. LITTLE INTEREST OR PLEASURE IN DOING THINGS: 0
2. FEELING DOWN, DEPRESSED OR HOPELESS: 0
SUM OF ALL RESPONSES TO PHQ QUESTIONS 1-9: 0
SUM OF ALL RESPONSES TO PHQ9 QUESTIONS 1 & 2: 0
SUM OF ALL RESPONSES TO PHQ QUESTIONS 1-9: 0
SUM OF ALL RESPONSES TO PHQ QUESTIONS 1-9: 0

## 2022-03-29 ASSESSMENT — ENCOUNTER SYMPTOMS
NAUSEA: 0
COUGH: 1
RHINORRHEA: 0
VOMITING: 0
EYE ITCHING: 0
EYE PAIN: 0
ABDOMINAL PAIN: 0
ABDOMINAL DISTENTION: 0
FACIAL SWELLING: 1
SINUS PAIN: 0
SHORTNESS OF BREATH: 1
SINUS PRESSURE: 1
DIARRHEA: 0

## 2022-03-30 LAB
HIV AG/AB: NORMAL
HIV ANTIGEN: NORMAL
HIV-1 ANTIBODY: NORMAL
HIV-2 AB: NORMAL

## 2022-04-01 LAB
QUANTI TB GOLD PLUS: NEGATIVE
QUANTI TB1 MINUS NIL: 0 IU/ML (ref 0–0.34)
QUANTI TB2 MINUS NIL: 0 IU/ML (ref 0–0.34)
QUANTIFERON MITOGEN: >10 IU/ML
QUANTIFERON NIL: 0.02 IU/ML

## 2022-05-02 ENCOUNTER — HOSPITAL ENCOUNTER (OUTPATIENT)
Dept: PHYSICAL THERAPY | Age: 32
Setting detail: THERAPIES SERIES
Discharge: HOME | End: 2022-05-02

## 2022-05-02 NOTE — PROGRESS NOTES
Pt's appointment cx by dept after her arrival due to pt's insurance plan not being active. Good milo estimate provided and pt's eval was rescheduled.

## 2022-05-05 ENCOUNTER — HOSPITAL ENCOUNTER (OUTPATIENT)
Dept: PHYSICAL THERAPY | Age: 32
Setting detail: THERAPIES SERIES
Discharge: HOME | End: 2022-05-05

## 2022-05-11 ENCOUNTER — HOSPITAL ENCOUNTER (OUTPATIENT)
Dept: PULMONOLOGY | Age: 32
Discharge: HOME | End: 2022-05-11
Payer: COMMERCIAL

## 2022-05-11 VITALS — OXYGEN SATURATION: 99 %

## 2022-05-11 DIAGNOSIS — R06.2 WHEEZING: ICD-10-CM

## 2022-05-11 PROCEDURE — 94070 EVALUATION OF WHEEZING: CPT

## 2022-05-11 PROCEDURE — 94726 PLETHYSMOGRAPHY LUNG VOLUMES: CPT

## 2022-05-11 PROCEDURE — 94760 N-INVAS EAR/PLS OXIMETRY 1: CPT

## 2022-05-11 PROCEDURE — 94729 DIFFUSING CAPACITY: CPT

## 2022-05-11 PROCEDURE — 6360000002 HC RX W HCPCS: Performed by: STUDENT IN AN ORGANIZED HEALTH CARE EDUCATION/TRAINING PROGRAM

## 2022-05-11 PROCEDURE — 94010 BREATHING CAPACITY TEST: CPT

## 2022-05-11 RX ORDER — ALBUTEROL SULFATE 2.5 MG/3ML
2.5 SOLUTION RESPIRATORY (INHALATION) ONCE
Status: COMPLETED | OUTPATIENT
Start: 2022-05-11 | End: 2022-05-11

## 2022-05-11 RX ADMIN — METHACHOLINE CHLORIDE 0.25 MG: 100 POWDER, FOR SOLUTION RESPIRATORY (INHALATION) at 13:19

## 2022-05-11 RX ADMIN — METHACHOLINE CHLORIDE 10 MG: 100 POWDER, FOR SOLUTION RESPIRATORY (INHALATION) at 13:30

## 2022-05-11 RX ADMIN — METHACHOLINE CHLORIDE 25 MG: 100 POWDER, FOR SOLUTION RESPIRATORY (INHALATION) at 13:35

## 2022-05-11 RX ADMIN — METHACHOLINE CHLORIDE 2.5 MG: 100 POWDER, FOR SOLUTION RESPIRATORY (INHALATION) at 13:26

## 2022-05-11 RX ADMIN — ALBUTEROL SULFATE 2.5 MG: 2.5 SOLUTION RESPIRATORY (INHALATION) at 13:41

## 2022-05-11 NOTE — TELEPHONE ENCOUNTER
Please call scheduling and let them know ok to give methacholine. If they need an additional order come get me from didactics.

## 2022-05-11 NOTE — TELEPHONE ENCOUNTER
[phone] informed them to look in pt's chart for a response and to call if they had questions.

## 2022-05-11 NOTE — TELEPHONE ENCOUNTER
[facility] Scheduling is calling requesting a provider call them to clarify patients Bronchial Challenge. Notes state:  Patient with concerns for asthma. Please complete full PFTs with methacholine challenge.  They are requesting a phone call asap the patient was at the hospital to schedule the test     [facility] Scheduling [phone]

## 2022-05-16 NOTE — PROCEDURES
[facility] [address]                               PULMONARY FUNCTION    PATIENT NAME: Jose Manuel Kulkarni                   :        1990  MED REC NO:   [id number]                          ROOM:  ACCOUNT NO:   [de-identified]                           ADMIT DATE: 2022  PROVIDER:     El Ring MD    DATE OF PROCEDURE:  2022    PFT INTERPRETATION    The patient is 27-year female who underwent a PFT for wheezing. Spirometry shows FVC to be 103%, FEV1 to FVC ratio was 94%, FEV1 to FVC  ratio was 91%, PVS29-67% was 73%. The patient's total lung capacity was  normal.  The patient has a minimally increased air trapping. The  patient's diffusion capacity when adjusted for volume was increased. The patient's flow-volume loop was normal.  The patient's methacholine  challenge test was negative. The patient essentially has a normal PFT  except that the patient may have some air trapping and the increase in  diffusion capacity when adjusted for volume which may be suggestive of  asthma like tendency, but not proven with a help of a methacholine  challenge test at this time. Please correlate clinically.         Lora Abdi MD    D: 05/15/2022 19:01:16       T: 05/15/2022 19:03:55     [email or web address]  Job#: [id number]     Doc#: [id number]    CC:

## 2022-06-02 ENCOUNTER — APPOINTMENT (OUTPATIENT)
Dept: PHYSICAL THERAPY | Age: 32
End: 2022-06-02
Payer: COMMERCIAL

## 2022-06-06 ENCOUNTER — HOSPITAL ENCOUNTER (OUTPATIENT)
Dept: PHYSICAL THERAPY | Age: 32
Setting detail: THERAPIES SERIES
Discharge: HOME | End: 2022-06-06
Payer: COMMERCIAL

## 2022-06-06 PROCEDURE — 97112 NEUROMUSCULAR REEDUCATION: CPT

## 2022-06-06 PROCEDURE — 97162 PT EVAL MOD COMPLEX 30 MIN: CPT

## 2022-06-06 NOTE — PROGRESS NOTES
[facility], [facility], [address], [address]  Phone: [phone]  Fax [phone]     Dear Referring Practitioner: Dr. Opal Moy DO,     We had the pleasure of evaluating the following patient for physical therapy services at [facility]. A summary of our findings can be found in the initial assessment below. This includes our plan of care. If you have any questions or concerns regarding these findings, please do not hesitate to contact me at the office phone number. Thank you for the referral.         Physician Signature:_______________________________Date:__________________  By signing above (or electronic signature), therapists plan is approved by physician      CERVICAL/THORACIC PHYSICAL THERAPY EVALUATION        Evaluation Date: 6/6/2022   Patient Name: Anil Holloway   YOB: 1990    Medical Diagnosis:  Pain of both wrist joints [M25.531, M25.532]  Cervicalgia [M54.2]  Treatment Diagnosis:  Neck pain  Onset Date:   3/29/22   Referral Date: 6/6/2022   Referring Provider: Kj Chawla DO   Insurance Provider: Tristan Yo  Restrictions/Precautions:   none    SUBJECTIVE FINDINGS    History of Present Illness:      Pt presents to physical therapy with c/o neck pain, headaches, dizziness, blurred vision, nausea, and arm and leg pain/tingling. She notes that the arm pain is near constant, but worse with driving. Gets numbness and tingling. Foot pain is on the inside of her foot B and worse with standing. Neck pain and arm pain are worse with sitting and driving. 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 Also notes pain with sleeping. The only thing she takes is Advil. Used to travel to [place]her [place] and receive treatment, but cannot recall specifically what they did.    Current Functional Limitations: able to complete everything, but with pain  PLOF: has had pain for 8-9 years, but worse since falling a few months ago off of a hoverboard  Medical History: self-reports osteochondritis    Red Flags:  nausea/vomiting  Quadrilateral symptoms    Pain    Patient describes pain to be aching, sore, sharp, constant  Patient reports 7/10 pain at present,  9/10 pain at its worst  Worsened by sitting, driving, standing, lying down, sleeping   Improved by Advil, resting      OBJECTIVE FINDINGS    Imaging Results:  none    [place] C-Spine Rule   []   Rule does not apply if any of the following are present:     -No Neck pain  -Unstable Vital Signs     -Non Trauma   -Known vertebral disease      -GCS <15   -Acute Paralysis   -Age <16  -Previous C-Spine Surgery    []  Imaging Indicated if:  []   Age 72 or older  [x]   Paresthesia in extremities  []   Dangerous Mechanism     [x]   Actively rotates head to 45 degrees bilat       Posture    [x]   Forward head  []   Forward flexed trunk   []   Pronounced CT junction    []   Increased thoracic kyphosis   []   Increased lumbar lordosis   []   Scoliosis   []   Swayback  [x]   Scapular winging:  [x]   Other:   Decreased cervical lordosis     Palpation/Tenderness/Visual Inspection      Patient reported tenderness with palpation  [x]   Yes   []   No   []   NT  Location:  Upper trap, suboccipitals, medial scapular musculature, cervical paraspinals   PT notes increased muscle tone   [x]   Yes   []   No   []   NT  Location: upper trap  Ligament tenderness/provocation:   [x]   Yes   []   No   []   NT  Location:   1st rib mobility on L, R C3-4 closed  Overhead mobility: WFL    Special Tests- Cervical and Thoracic   Special Test Findings Comments   Sharp Rolly [x]Neg   []Pos   []NT    Alar ligament/ C2 spinous kick test [x]Neg   []Pos   []NT    Vertebral Artery/Positional Provocative Testing [x]Neg   []Pos R   []Pos L   []NT    Dizziness, Nausea, Double Vision []Neg   [x]Pos R   [x]Pos L Blurry and nausea around eyes, happens often   Spurling's/Foraminal [x]Neg   []Pos R   []Pos L   []NT    Cranial Nerves [x]Neg   []Pos R   []Pos L    Cervical Distraction [x]Relief noted   []No relief noted   []NT    Cervical Compression  []Neg   [x]Pos   []NT    Median nerve tension test []Neg   []Pos R   []Pos L   [x]NT    Radial nerve tension test []Neg   []Pos R   []Pos L   [x]NT    Ulnar nerve tension test []Neg   []Pos R   []Pos L   [x]NT    Thoracic Outlet  []Neg   []Pos R   []Pos L   [x]NT      Special Tests- UE (Shoulder, Elbow, Wrist)   Special Test Findings   Painful arc []Neg   []Pos R   [x]Pos L   []NT   Empty can test []Neg   []Pos R   []Pos L   [x]NT   Drop arm test []Neg   []Pos R   []Pos L   [x]NT   Neer's impingement []Neg   []Pos R   []Pos L   [x]NT   Speed's test  []Neg   []Pos R   []Pos L   [x]NT   Mya  Test []Neg   []Pos R   []Pos L   [x]NT   Jerk Test []Neg   []Pos R   []Pos L   [x]NT   Infraspinatus Test []Neg   []Pos R   []Pos L   [x]NT       Range of Motion/Strength Testing/Myotomes    [x] All ROM and strength WNL except as marked below  *denotes pain  Range Tested AROM PROM Strength (MMT)/Myotomes    Left (or  neutral)  Right Left Right Left (or neutral) Right   Cervical Flex (C1-2) 75%        Cervical Ext 75%        Cervical SB (C3) 75% 75%       Cervical Rot 100% 100%       Shoulder Shrug (C4)     5 5   Shoulder Flex     4- 4-   Shoulder Ext         Shoulder Abd (C5)     4- 4-   Shoulder Add         Shoulder IR     4 4   Shoulder ER      4 4   Elbow flex (C6)     4+ 4+   Elbow ext (C7)     4+ 4+   Wrist ext (C6)     5 5   Wrist flex (C7)     5 5   Supination          Pronation         Thumb Ext (C8)     5 5   Intrinsics (T1)           UE Dermatomes     [x] All dermatomes WNL except as marked below   Dermatomes  Left  Right Comments   Posterior Head (C2)      Lateral Upper Neck (C3)      Supraclavicular (C4)      Lateral Upper Arm (C5)  decreased    Lateral Forearm/1st (C6)      3rd digit (C7)      5th digit (C8)      Medial Arm (T1)        Reflexes      [x] All reflexes WNL except as marked below  Reflex Left Right   Biceps (C5, C6)     Brachioradialis (C6)     Triceps (C7)     Abductor Digiti Minimi (C8, T1)     Hoffmans     Clonus       Scapula Strength     [x] All strength WNL except as marked below   Scapula Left Right Comments   Upper Trapezius      Middle Trapezius      Lower Trapezius      Rhomboid      Serratus Anterior        Latissimus Dorsi        Flexibility   [x] All flexibility WNL except as marked below     Muscle Findings   Pectoralis Minor [] WNL   [] Tight    Pec Major - Lower [] WNL   [] Tight   Pec Major - Upper [] WNL   [] Tight   Latissimus Dorsi  [] WNL   [] Tight   Levator Scapula [] WNL   [x] Tight   Suboccipitals [] WNL   [x] Tight   Upper Trapezius [] WNL   [x] Tight   Scalenes [] WNL   [] Tight     Joint Mobility/Accessory Movements (cervical, UE, rib)    1st rib L hypomobility  C3-4 hypomobility on R  C0-1 hypomobilty B  CTJ hypomobility    ASSESSMENT  Pt is a 26 y/o presenting to physical therapy with c/o neck pain, paresthesia in B UE and B foot pain (foot pain not formally evaluated this date, but noted). Through evaluation and examination, identified findings consistent with impaired biomechanics and aberrant motion patterns, most noteably hypomobility at 1st rib, cervical spine, and straightening of the cervical lordosis. PT recommending skilled manual and therapeutic exercise intervention to address deficits and attain below-stated functional goals. Body Structures, Functions, Activity Limitations Requiring Skilled Therapeutic Intervention: Decreased functional mobility ,Decreased ADL status,Decreased ROM,Decreased body mechanics,Decreased strength,Increased pain     Statement of Medical Necessity: Physical Therapy is both indicated and medically necessary as outlined in the POC to increase the likelihood of meeting the functionally related goals stated below.       Rich Complexity:    Decision Making: Mod Complexity     PLAN OF CARE    Frequency: 2x/wk for 5 weeks  Current Treatment Recommendations: Therapeutic exercise, therapeutic activity, manual therapy, gait training, neuromuscular re-education     G-CODE  FOTO score 45/100      GOALS  Short term goal 1: Patient will be independent with HEP  Short term goal 2: Patient will improve FOTO by predictive value  Short term goal 3: Patient will demonstrate full, painfree cervical ROM  Short term goal 4: Patient will report sleeping, working, and caring for children without limitation in pain  Short term goal 5: Patient will demonstrate WNL B UE strength     Thank you for the referral of this patient.      Time In: 2:35 PM  Time Out: 3:20 PM  Timed Code Treatment Minutes: 15 minutes            Total Treatment Time:  45 minutes    Liv Atkinson PT DPT license #[id number]

## 2022-06-06 NOTE — FLOWSHEET NOTE
Mina  [facility]. and Therapy, [facility], [facility] [address]  Phone: [phone]  Fax [phone]    Physical Therapy Daily Treatment Note    Date:  2022    Patient Name:  Lorna Marie    :  1990  MRN: [id number]  Restrictions/Precautions:  [address]  [place], [phone]  Medical/Treatment Diagnosis Information:  · Diagnosis: Bilateral wrist pain, Cervical pain (neck)  Insurance/Certification information:  PT Insurance Information: [facility]  Physician Information:   i[employer] May, DO  Plan of care signed (Y/N):  N  Visit# / total visits:  1/10     G-Code (if applicable):           Physical FS Primary Measure 45  Predicted Points of Change  13  Predicted # of visits   12  Predicted Discharge FS Score 58    Medicare Cap (if applicable):  n/a = total amount used, updated 2022    Time in:   2:35      Timed Treatment: 15 Total Treatment Time:  45  Time out: 3:20  ________________________________________________________________________________________    Pain Level:    /10  SUBJECTIVE:  See initial eval    OBJECTIVE:     Exercise/Equipment Resistance/Repetitions Other comments          Suboccipital release with tennis balls 3-5 minute hold followed by 10 nods HEP   Towel roll inside of pillow  HEP   TA set  NV    TA set with cervical rotation NV    All 4 serratus sit back NV    Foam roll protocol NV                                                                                             Other Therapeutic Activities:  Education regarding POC including demonstration with models of anatomy and physiology in order to maximize benefits of treatment; total neuro re-ed 15 minutes    Manual Treatments:       Suboccipital release NV  Manual cervical distractionNV  MFR cervical paraspinals  1st rib mobilization NV  Cervical rotational mobs NV  Thoracic distraction mobs NV  CTJ mobilization NV    Modalities: Test/Measurements:  See initial eval       ASSESSMENT:   See initial eval      Treatment/Activity Tolerance:   [x]Patient tolerated treatment well [] Patient limited by fatique  []Patient limited by pain [] Patient limited by other medical complications  [] Other:     GOALS 5 weeks  Short term goal 1: Patient will be independent with HEP  Short term goal 2: Patient will improve FOTO by predictive value  Short term goal 3: Patient will demonstrate full, painfree cervical ROM  Short term goal 4: Patient will report sleeping, working, and caring for children without limitation in pain  Short term goal 5: Patient will demonstrate WNL B UE strength          Plan: [] Continue per plan of care [] Alter current plan (see comments)   [x] Plan of care initiated [] Hold pending MD visit [] Discharge      Plan for Next Session:      Re-Certification Due Date:         Signature:  Faizan Rodriguez PT

## 2022-06-08 ENCOUNTER — HOSPITAL ENCOUNTER (OUTPATIENT)
Dept: PHYSICAL THERAPY | Age: 32
Setting detail: THERAPIES SERIES
Discharge: HOME | End: 2022-06-08
Payer: COMMERCIAL

## 2022-06-08 PROCEDURE — 97110 THERAPEUTIC EXERCISES: CPT

## 2022-06-08 PROCEDURE — 97140 MANUAL THERAPY 1/> REGIONS: CPT

## 2022-06-08 NOTE — FLOWSHEET NOTE
Mina  77. and Therapy, [facility], [address] [facility], [address] [place] [address]  Phone: [phone]  Fax [phone]    Physical Therapy Daily Treatment Note    Date:  2022    Patient Name:  Milla Tran    :  1990  MRN: [id number]  Restrictions/Precautions:  [address]  [place], [phone]  Medical/Treatment Diagnosis Information:  · Diagnosis: Bilateral wrist pain, Cervical pain (neck)  Insurance/Certification information:  PT Insurance Information: [facility]  Physician Information:   Alin Leach May   Plan of care signed (Y/N):  N  Visit# / total visits:  2/10     G-Code (if applicable):           Physical FS Primary Measure 45  Predicted Points of Change  13  Predicted # of visits   12  Predicted Discharge FS Score 58    Medicare Cap (if applicable):  n/a = total amount used, updated 2022    Time in:   10:00   Timed Treatment: 45 Total Treatment Time:  45  Time out: 10:45  ________________________________________________________________________________________    Pain Level:    /10  SUBJECTIVE:  Pt reports that she feels a little better since putting a towel in the bottom of her pillow. Hands/wrists are not as painful, but continues to have a 50/50 headache. OBJECTIVE:  services not available due to spatial constraints and telephone communication limitations.      Exercise/Equipment Resistance/Repetitions Other comments          Suboccipital release with tennis balls 3-5 minute hold followed by 10 nods HEP   Towel roll inside of pillow  HEP   TA set  NV    TA set with cervical rotation NV    All 4 serratus sit back NV    Foam roll protocol   - shld flex   - hugs   - punches  3' before, 1' after   10x   10x    10x HEP                                                                                            Other Therapeutic Activities:      Manual Treatments:       Suboccipital release   Manual cervical distraction  MFR cervical paraspinals  1st rib mobilization   Cervical rotational mobs gr III  Thoracic distraction mobs gr III  CTJ mobilization   UT stretch manual    Total manual 30 minutes     Modalities:      Test/Measurements:  See initial eval       ASSESSMENT:  Patient reported improved \"looseness\" following treatment. Mild discomfort in lower back following foam roller, particularly on R side. Informed patient to use a pillow at home. Ordered foam roller during appointment. Progress as pt tolerates.      Treatment/Activity Tolerance:   [x]Patient tolerated treatment well [] Patient limited by fatique  []Patient limited by pain [] Patient limited by other medical complications  [] Other:     GOALS 5 weeks  Short term goal 1: Patient will be independent with HEP  Short term goal 2: Patient will improve FOTO by predictive value  Short term goal 3: Patient will demonstrate full, painfree cervical ROM  Short term goal 4: Patient will report sleeping, working, and caring for children without limitation in pain  Short term goal 5: Patient will demonstrate WNL B UE strength          Plan: [x] Continue per plan of care [] Alter current plan (see comments)   [] Plan of care initiated [] Hold pending MD visit [] Discharge      Plan for Next Session:      Re-Certification Due Date:         Signature:  Pepito Amezcua, PT

## 2022-06-21 ENCOUNTER — HOSPITAL ENCOUNTER (OUTPATIENT)
Dept: PHYSICAL THERAPY | Age: 32
Setting detail: THERAPIES SERIES
Discharge: HOME | End: 2022-06-21
Payer: COMMERCIAL

## 2022-06-21 PROCEDURE — 97140 MANUAL THERAPY 1/> REGIONS: CPT

## 2022-06-21 PROCEDURE — 97110 THERAPEUTIC EXERCISES: CPT

## 2022-06-21 NOTE — FLOWSHEET NOTE
Mina [facility]t [facility] and Therapy, [facility], [address] [place] [address], [address] [place] [address]  Phone: [phone]  Fax [phone]    Physical Therapy Daily Treatment Note    Date:  2022    Patient Name:  Farrah Keyes    :  1990  MRN: [id number]  Restrictions/Precautions:  [address]  [place], [phone]  Medical/Treatment Diagnosis Information:  · Diagnosis: Bilateral wrist pain, Cervical pain (neck)  Insurance/Certification information:  PT Insurance Information: [facility]  Physician Information:   Debo Payne May, DO  Plan of care signed (Y/N):  Y  Visit# / total visits:  3/10     G-Code (if applicable):           Physical FS Primary Measure 45  Predicted Points of Change  13  Predicted # of visits   12  Predicted Discharge FS Score 58    Medicare Cap (if applicable):  n/a = total amount used, updated 2022    Time in:   3:30   Timed Treatment: 45 Total Treatment Time:  45  Time out: 4:15  ________________________________________________________________________________________    Pain Level:    /10  SUBJECTIVE:  Pt reports that she felt improvement for about two days after last session, but her headache has returned. Back pain is much better using the foam roller. Leg/foot pain is unchanged.      OBJECTIVE: [place]  services through [employer] [phone]    Exercise/Equipment Resistance/Repetitions Other comments          Suboccipital release with tennis balls 3-5 minute hold followed by 10 nods HEP   Towel roll inside of pillow  HEP   DNF set 10x5\" in sitting HEP   DNF set with cervical rotation 10x B in sitting HEP   All 4 serratus sit back NV    Foam roll protocol   - shld flex   - hugs   - punches  HEP   UT stretch in sitting 3x15\" B HEP          Foam roller to calf and bottom of foot   HEP                                                                     Other Therapeutic Activities:      Manual Treatments: Suboccipital release   Manual cervical distraction  MFR cervical paraspinals  1st rib mobilization   Cervical rotational mobs gr III  Thoracic distraction mobs gr III  CTJ mobilization   UT stretch manual    Total manual 30 minutes     Modalities:      Test/Measurements:  See initial eval       ASSESSMENT:  Patient reported improved \"looseness\" following treatment and elimination of headache. Progress as pt tolerates.      Treatment/Activity Tolerance:   [x]Patient tolerated treatment well [] Patient limited by fatique  []Patient limited by pain [] Patient limited by other medical complications  [] Other:     GOALS 5 weeks  Short term goal 1: Patient will be independent with HEP  Short term goal 2: Patient will improve FOTO by predictive value  Short term goal 3: Patient will demonstrate full, painfree cervical ROM  Short term goal 4: Patient will report sleeping, working, and caring for children without limitation in pain  Short term goal 5: Patient will demonstrate WNL B UE strength          Plan: [x] Continue per plan of care [] Alter current plan (see comments)   [] Plan of care initiated [] Hold pending MD visit [] Discharge      Plan for Next Session:      Re-Certification Due Date:         Signature:  Vincent Guzman PT

## 2022-06-24 ENCOUNTER — HOSPITAL ENCOUNTER (OUTPATIENT)
Dept: PHYSICAL THERAPY | Age: 32
Setting detail: THERAPIES SERIES
Discharge: HOME | End: 2022-06-24
Payer: COMMERCIAL

## 2022-06-24 PROCEDURE — 97140 MANUAL THERAPY 1/> REGIONS: CPT

## 2022-06-24 PROCEDURE — 97110 THERAPEUTIC EXERCISES: CPT

## 2022-06-24 NOTE — FLOWSHEET NOTE
Mina [facility] and Therapy, [facility], [address] [place] [address], [address] [place] [address]  Phone: [phone]  Fax [phone]    Physical Therapy Daily Treatment Note    Date:  2022    Patient Name:  Pavan Kingston    :  1990  MRN: [id number]  Restrictions/Precautions:  [address]  [place], [phone]  Medical/Treatment Diagnosis Information:  · Diagnosis: Bilateral wrist pain, Cervical pain (neck)  Insurance/Certification information:  PT Insurance Information: [facility]  Physician Information:   Radha Sports May, DO  Plan of care signed (Y/N):  Y  Visit# / total visits:  4/10     G-Code (if applicable):           Physical FS Primary Measure 45  Predicted Points of Change  13  Predicted # of visits   12  Predicted Discharge FS Score 58    Medicare Cap (if applicable):  n/a = total amount used, updated 2022    Time in:   3:20   Timed Treatment: 40 Total Treatment Time:  40  Time out: 4:00  ________________________________________________________________________________________    Pain Level:    /10  SUBJECTIVE:  Pt reports that her neck is feeling better for longer. Continues to have afternoon and evening headaches and loss of energy as a result of her concussion.      OBJECTIVE: [address]  [facility] [phone]    Exercise/Equipment Resistance/Repetitions Other comments          Suboccipital release with tennis balls 3-5 minute hold followed by 10 nods HEP   Towel roll inside of pillow  HEP   DNF set 10x5\" in sitting HEP   DNF set with cervical rotation 10x B in sitting HEP   Supine privot prone 10x    Supine shld flex holding SB 10x       Foam roll protocol   - shld flex   - hugs   - punches  3' before, 1' after   10x   10x    10xHEP   UT stretch in sitting 3x15\" B HEP   Lower trap setting on wall 10x HEP   Prone T and Y NV                Foam roller to calf and bottom of foot   HEP Other Therapeutic Activities:      Manual Treatments:       Suboccipital release   Manual cervical distraction  MFR cervical paraspinals  1st rib mobilization   Cervical rotational mobs gr III  Thoracic distraction mobs gr III  CTJ mobilization   UT stretch manual    Total manual 30 minutes     Modalities:      Test/Measurements:  See initial eval       ASSESSMENT:  Patient reported improved \"looseness\" following treatment and elimination of headache. Progress as pt tolerates.      Treatment/Activity Tolerance:   [x]Patient tolerated treatment well [] Patient limited by fatique  []Patient limited by pain [] Patient limited by other medical complications  [] Other:     GOALS 5 weeks  Short term goal 1: Patient will be independent with HEP  Short term goal 2: Patient will improve FOTO by predictive value  Short term goal 3: Patient will demonstrate full, painfree cervical ROM  Short term goal 4: Patient will report sleeping, working, and caring for children without limitation in pain  Short term goal 5: Patient will demonstrate WNL B UE strength          Plan: [x] Continue per plan of care [] Alter current plan (see comments)   [] Plan of care initiated [] Hold pending MD visit [] Discharge      Plan for Next Session:      Re-Certification Due Date:         Signature:  Warren Guy PT

## 2022-06-27 ENCOUNTER — HOSPITAL ENCOUNTER (OUTPATIENT)
Dept: PHYSICAL THERAPY | Age: 32
Setting detail: THERAPIES SERIES
Discharge: HOME | End: 2022-06-27
Payer: COMMERCIAL

## 2022-06-27 PROCEDURE — 97110 THERAPEUTIC EXERCISES: CPT

## 2022-06-27 PROCEDURE — 97140 MANUAL THERAPY 1/> REGIONS: CPT

## 2022-06-27 NOTE — FLOWSHEET NOTE
Mina  [facility] Therapy, [address]  Phone: [phone]  Fax [phone]    Physical Therapy Daily Treatment Note    Date:  2022    Patient Name:  Jacqueline Knowles    :  1990  MRN: [id number]  Restrictions/Precautions:  [address]  [facility], [phone]  Medical/Treatment Diagnosis Information:  · Diagnosis: Bilateral wrist pain, Cervical pain (neck)  Insurance/Certification information:  PT Insurance Information: [facility]  Physician Information:   Rivas Crespo May, DO  Plan of care signed (Y/N):  Y  Visit# / total visits:  5/10     G-Code (if applicable):           Physical FS Primary Measure 45  Predicted Points of Change  13  Predicted # of visits   12  Predicted Discharge FS Score 58    Medicare Cap (if applicable):  n/a = total amount used, updated 2022    Time in:   3:15  Timed Treatment: 45 Total Treatment Time:  45  Time out: 4:00  ________________________________________________________________________________________    Pain Level:    /10  SUBJECTIVE:  Pt reports that she still has tension on L side of her neck, but that her headache is gone. Feet are unchanged.      OBJECTIVE: [address]  services through [facility] [phone]    Exercise/Equipment Resistance/Repetitions Other comments          Suboccipital release with tennis balls  HEP   Towel roll inside of pillow  HEP   DNF set 10x5\" in sitting HEP   DNF set with cervical rotation 10x B in sitting HEP   Supine privot prone 10x    Supine shld flex holding SB 10x       Foam roll protocol   - shld flex   - hugs   - punches  3' before, 1' after   10x   10x    10xHEP   UT stretch in sitting 3x15\" B HEP   Lower trap setting on wall 10x HEP   Prone T and Y NV    Standing pivot prone 10x HEP   Standing DNF with shld scaption 10x HEP   Foam roller to calf and bottom of foot   HEP   gastroc stretch off step 3x15\"B HEP Other Therapeutic Activities:      Manual Treatments:       Suboccipital release   Manual cervical distraction  MFR cervical paraspinals  1st rib mobilization   Cervical rotational mobs gr III  Thoracic distraction mobs gr III  CTJ mobilization   UT stretch manual    Total manual 30 minutes     Modalities:      Test/Measurements:  See initial eval       ASSESSMENT:  Patient reported improved \"looseness\" following treatment and elimination of headache. Progress as pt tolerates.      Treatment/Activity Tolerance:   [x]Patient tolerated treatment well [] Patient limited by fatique  []Patient limited by pain [] Patient limited by other medical complications  [] Other:     GOALS 5 weeks  Short term goal 1: Patient will be independent with HEP  Short term goal 2: Patient will improve FOTO by predictive value  Short term goal 3: Patient will demonstrate full, painfree cervical ROM  Short term goal 4: Patient will report sleeping, working, and caring for children without limitation in pain  Short term goal 5: Patient will demonstrate WNL B UE strength          Plan: [x] Continue per plan of care [] Alter current plan (see comments)   [] Plan of care initiated [] Hold pending MD visit [] Discharge      Plan for Next Session:      Re-Certification Due Date:         Signature:  Yulissa Bernstein, PT

## 2022-06-29 ENCOUNTER — HOSPITAL ENCOUNTER (OUTPATIENT)
Dept: PHYSICAL THERAPY | Age: 32
Setting detail: THERAPIES SERIES
Discharge: HOME | End: 2022-06-29
Payer: COMMERCIAL

## 2022-06-29 NOTE — PROGRESS NOTES
Physical Therapy  Cancellation/No-show Note  Patient Name:  Josue Herrera  :  1990   Date:  2022  Cancels to date: 0  No-shows to date: 1    For today's appointment patient:  [] Cancelled  [] Rescheduled appointment  [x] No-show     Reason given by patient:  [] Patient ill  [] Conflicting appointment  [] No transportation    [] Conflict with work  [] No reason given  [] Other:     Comments:      Electronically signed by:  Myriam Dillon PT

## 2022-07-08 ENCOUNTER — HOSPITAL ENCOUNTER (OUTPATIENT)
Dept: PHYSICAL THERAPY | Age: 32
Setting detail: THERAPIES SERIES
Discharge: HOME | End: 2022-07-08
Payer: COMMERCIAL

## 2022-07-08 PROCEDURE — 97140 MANUAL THERAPY 1/> REGIONS: CPT

## 2022-07-08 PROCEDURE — 97110 THERAPEUTIC EXERCISES: CPT

## 2022-07-08 NOTE — FLOWSHEET NOTE
Mina [facility] and Therapy, [facility], [address] [place] [address]  Phone: [phone]  Fax [phone]    Physical Therapy Daily Treatment Note    Date:  2022    Patient Name:  Luna Monge    :  1990  MRN: [id number]  Restrictions/Precautions:  [address]  [place], [phone]  Medical/Treatment Diagnosis Information:  · Diagnosis: Bilateral wrist pain, Cervical pain (neck)  Insurance/Certification information:  PT Insurance Information: [facility]  Physician Information:   Tami Perla May, DO  Plan of care signed (Y/N):  Y  Visit# / total visits:  6/10     G-Code (if applicable):           Physical FS Primary Measure 45  Predicted Points of Change  13  Predicted # of visits   12  Predicted Discharge FS Score 58    Medicare Cap (if applicable):  n/a = total amount used, updated 2022    Time in:   3:15  Timed Treatment: 45 Total Treatment Time:  45  Time out: 4:00  ________________________________________________________________________________________    Pain Level:    /10  SUBJECTIVE:  Pt reports that she still has tension on L side of her neck, and down her L arm when she reaches it back.      OBJECTIVE: [id number]      Exercise/Equipment Resistance/Repetitions Other comments          Suboccipital release with tennis balls  HEP   Towel roll inside of pillow  HEP   DNF set HEP   DNF set with cervical rotation HEP   Supine privot prone    Supine shld flex holding SB        Foam roll protocol   - shld flex   - hugs   - punches  HEP   UT stretch in sitting HEP   Lower trap setting on wall HEP   Prone T and Y    Standing pivot prone HEP   Standing DNF with shld scaption HEP   Foam roller to calf and bottom of foot  HEP   gastroc stretch off step HEP          Self SNAG   x10 minutes including time for demonstration and education with you tube video                                                Other Therapeutic Activities: Manual Treatments:       Suboccipital release   Manual cervical distraction  MFR cervical paraspinals  1st rib mobilization   Cervical rotational mobs gr III  Thoracic distraction mobs gr III  CTJ mobilization   UT stretch manual  L median nerve tensioners/sliders    Total manual 35 minutes     Modalities:      Test/Measurements:  See initial eval       ASSESSMENT:  Patient reported improved \"looseness\" following treatment and elimination of headache. Continued nerve pain into arm, but better than at start of session. Progress as pt tolerates.      Treatment/Activity Tolerance:   [x]Patient tolerated treatment well [] Patient limited by fatique  []Patient limited by pain [] Patient limited by other medical complications  [] Other:     GOALS 5 weeks  Short term goal 1: Patient will be independent with HEP  Short term goal 2: Patient will improve FOTO by predictive value  Short term goal 3: Patient will demonstrate full, painfree cervical ROM  Short term goal 4: Patient will report sleeping, working, and caring for children without limitation in pain  Short term goal 5: Patient will demonstrate WNL B UE strength          Plan: [x] Continue per plan of care [] Alter current plan (see comments)   [] Plan of care initiated [] Hold pending MD visit [] Discharge      Plan for Next Session:      Re-Certification Due Date:         Signature:  Landrum Gitelman, PT

## 2022-07-11 ENCOUNTER — HOSPITAL ENCOUNTER (OUTPATIENT)
Dept: PHYSICAL THERAPY | Age: 32
Setting detail: THERAPIES SERIES
Discharge: HOME | End: 2022-07-11
Payer: COMMERCIAL

## 2022-07-11 PROCEDURE — 97140 MANUAL THERAPY 1/> REGIONS: CPT

## 2022-07-11 PROCEDURE — 97110 THERAPEUTIC EXERCISES: CPT

## 2022-07-11 NOTE — FLOWSHEET NOTE
[facility], [facility] [address] [place] [address]  Phone: [phone]  Fax [phone]    Physical Therapy Daily Treatment Note    Date:  2022    Patient Name:  Yani Perkins    :  1990  MRN: [id number]  Restrictions/Precautions:  [address]  [place], [phone]  Medical/Treatment Diagnosis Information:  · Diagnosis: Bilateral wrist pain, Cervical pain (neck)  Insurance/Certification information:  PT Insurance Information: [facility]  Physician Information:   Chani Nixon May, DO  Plan of care signed (Y/N):  Y  Visit# / total visits:  7/10     G-Code (if applicable):           Physical FS Primary Measure 45  Predicted Points of Change  13  Predicted # of visits   12  Predicted Discharge FS Score 58    Medicare Cap (if applicable):  n/a = total amount used, updated 2022    Time in:   2:30  Timed Treatment: 40 Total Treatment Time:  40  Time out: 3:10  ________________________________________________________________________________________    Pain Level:    /10  SUBJECTIVE:  Pt reports that she still has tension on L side of her neck, and down her L arm when she reaches it back. 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      OBJECTIVE: [id number]      Exercise/Equipment Resistance/Repetitions Other comments          Suboccipital release with tennis balls  HEP   Towel roll inside of pillow  HEP   DNF set HEP   DNF set with cervical rotation HEP   Supine privot prone 10x   Supine shld flex holding SB        Foam roll protocol   - shld flex   - hugs   - punches  HEP   UT stretch in sitting HEP   Lower trap setting on wall 10xHEP   Prone T and Y    Standing pivot prone HEP   Standing DNF with shld scaption HEP   Foam roller to calf and bottom of foot  HEP   gastroc stretch off step HEP          Self SNAG   x10 minutes including time for demonstration and education with you tube video    L shoulder neuro re-ed   x5 mins with 1# HEP   Ball on wall 2x30\" HEP                                 Other Therapeutic Activities:      Manual Treatments:       Suboccipital release   Manual cervical distraction  MFR cervical paraspinals  1st rib mobilization   Cervical rotational mobs gr III  Thoracic distraction mobs gr III  CTJ mobilization   UT stretch manual  L median nerve tensioners/sliders    Total manual 30 minutes     Modalities:      Test/Measurements:  See initial eval       ASSESSMENT:  Patient reported improved \"looseness\" following treatment and elimination of headache. Continued nerve pain into arm, but better than at start of session. Progress as pt tolerates.      Treatment/Activity Tolerance:   [x]Patient tolerated treatment well [] Patient limited by fatique  []Patient limited by pain [] Patient limited by other medical complications  [] Other:     GOALS 5 weeks  Short term goal 1: Patient will be independent with HEP  Short term goal 2: Patient will improve FOTO by predictive value  Short term goal 3: Patient will demonstrate full, painfree cervical ROM  Short term goal 4: Patient will report sleeping, working, and caring for children without limitation in pain  Short term goal 5: Patient will demonstrate WNL B UE strength          Plan: [x] Continue per plan of care [] Alter current plan (see comments)   [] Plan of care initiated [] Hold pending MD visit [] Discharge      Plan for Next Session:      Re-Certification Due Date:         Signature:  Alecia Avitia PT

## 2022-07-15 ENCOUNTER — HOSPITAL ENCOUNTER (OUTPATIENT)
Dept: PHYSICAL THERAPY | Age: 32
Setting detail: THERAPIES SERIES
Discharge: HOME | End: 2022-07-15
Payer: COMMERCIAL

## 2022-07-15 PROCEDURE — 97110 THERAPEUTIC EXERCISES: CPT

## 2022-07-15 PROCEDURE — 97140 MANUAL THERAPY 1/> REGIONS: CPT

## 2022-07-15 NOTE — FLOWSHEET NOTE
[address] [place] [address] and Therapy, [facility], [address] [place] [address]  Phone: [phone]  Fax [phone]    Physical Therapy Daily Treatment Note    Date:  7/15/2022    Patient Name:  Silas Spurling    :  1990  MRN: [id number]  Restrictions/Precautions:  [address]  [place], [phone]  Medical/Treatment Diagnosis Information:  Diagnosis: Bilateral wrist pain, Cervical pain (neck)  Insurance/Certification information:  PT Insurance Information: [facility]  Physician Information:   Ada Alcantar May, DO  Plan of care signed (Y/N):  Y  Visit# / total visits:  8/10     G-Code (if applicable):           Physical FS Primary Measure 45  Predicted Points of Change  13  Predicted # of visits   12  Predicted Discharge FS Score 58    Medicare Cap (if applicable):  n/a = total amount used, updated 7/15/2022    Time in:  3:20  Timed Treatment: 40  Total Treatment Time:  40  Time out: 4:00  ________________________________________________________________________________________    Pain Level:    /10  SUBJECTIVE:  Pt reports that her neck may be a \"little bit better, but it still hurts. I don't know why. \"       OBJECTIVE: [id number]    Exercise/Equipment Resistance/Repetitions Other comments          Suboccipital release with tennis balls  HEP   Towel roll inside of pillow  HEP   DNF set 10x5\" in sitting HEP   DNF set with cervical rotation 10x B in sitting HEP   Supine privot prone 10x    Supine shld flex holding SB        Foam roll protocol   - shld flex   - hugs   - punches  HEP   UT stretch in sitting 3x15\" B HEP   Levator scap stretch in sitting 3x15\" B HEP   Lower trap setting on wall 10x HEP   Prone T and Y    Standing pivot prone HEP   Standing DNF with shld scaption HEP   Foam roller to calf and bottom of foot  HEP   gastroc stretch off step HEP          Self SNAG   x10 minutes including time for demonstration and education with you tube video    L shoulder neuro re-ed   x5 mins with 1# HEP   Ball on wall 2x30\" HEP                                 Other Therapeutic Activities:      Manual Treatments:       Suboccipital release   Manual cervical distraction  MFR cervical paraspinals  1st rib mobilization   Cervical rotational mobs gr III  Thoracic distraction mobs gr III  CTJ mobilization   UT stretch manual  L median nerve tensioners/sliders    Total manual 30 minutes     Modalities:      Test/Measurements:  See initial eval       ASSESSMENT:  Patient reported improved \"looseness\" following treatment and elimination of headache. Continued nerve pain into arm, but better than at start of session. Progress as pt tolerates.      Treatment/Activity Tolerance:   [x]Patient tolerated treatment well [] Patient limited by fatique  []Patient limited by pain [] Patient limited by other medical complications  [] Other:     GOALS 5 weeks  Short term goal 1: Patient will be independent with HEP  Short term goal 2: Patient will improve FOTO by predictive value  Short term goal 3: Patient will demonstrate full, painfree cervical ROM  Short term goal 4: Patient will report sleeping, working, and caring for children without limitation in pain  Short term goal 5: Patient will demonstrate WNL B UE strength          Plan: [x] Continue per plan of care [] Alter current plan (see comments)   [] Plan of care initiated [] Hold pending MD visit [] Discharge      Plan for Next Session:      Re-Certification Due Date:         Signature:  Frankie Garcia PT

## 2022-07-22 DIAGNOSIS — T78.40XA ALLERGY, INITIAL ENCOUNTER: ICD-10-CM

## 2022-07-25 RX ORDER — LORATADINE 10 MG/1
TABLET ORAL
Qty: 30 TABLET | Refills: 3 | Status: SHIPPED | OUTPATIENT
Start: 2022-07-25

## 2022-08-02 ENCOUNTER — HOSPITAL ENCOUNTER (OUTPATIENT)
Dept: PHYSICAL THERAPY | Age: 32
Setting detail: THERAPIES SERIES
Discharge: HOME | End: 2022-08-02

## 2022-08-05 ENCOUNTER — HOSPITAL ENCOUNTER (OUTPATIENT)
Dept: PHYSICAL THERAPY | Age: 32
Setting detail: THERAPIES SERIES
Discharge: HOME | End: 2022-08-05

## 2022-08-05 NOTE — PROGRESS NOTES
Physical Therapy  Cancellation/No-show Note  Patient Name:  Usha Headley  :  1990   Date:  2022  Cancels to date: 0  No-shows to date: 3    For today's appointment patient:  [] Cancelled  [] Rescheduled appointment  [x] No-show     Reason given by patient:  [] Patient ill  [] Conflicting appointment  [] No transportation    [] Conflict with work  [] No reason given  [] Other:     Comments:      Electronically signed by:  Miguel A Ortiz, PT

## 2022-08-08 ENCOUNTER — HOSPITAL ENCOUNTER (OUTPATIENT)
Dept: PHYSICAL THERAPY | Age: 32
Setting detail: THERAPIES SERIES
Discharge: HOME | End: 2022-08-08

## 2022-08-08 NOTE — PROGRESS NOTES
Physical Therapy  Cancellation/No-show Note  Patient Name:  Elizabeth Aguillon  :  1990   Date:  2022  Cancels to date: 0  No-shows to date: 11    For today's appointment patient:  [] Cancelled  [] Rescheduled appointment  [x] No-show     Reason given by patient:  [] Patient ill  [] Conflicting appointment  [] No transportation    [] Conflict with work  [] No reason given  [] Other:     Comments:      Electronically signed by:  Carmina Martell PT

## 2022-08-12 ENCOUNTER — HOSPITAL ENCOUNTER (OUTPATIENT)
Dept: PHYSICAL THERAPY | Age: 32
Setting detail: THERAPIES SERIES
Discharge: HOME | End: 2022-08-12

## 2022-12-26 DIAGNOSIS — T78.40XA ALLERGY, INITIAL ENCOUNTER: ICD-10-CM

## 2022-12-27 NOTE — TELEPHONE ENCOUNTER
Refill Request       Last Seen: Last Seen Department: 3/29/2022  Last Seen by PCP: Visit date not found    Last Written: 07/25/2022    Next Appointment:   No future appointments.           Requested Prescriptions     Pending Prescriptions Disp Refills    loratadine (CLARITIN) 10 MG tablet [Pharmacy Med Name: LORATADINE 10 MG TABLET] 30 tablet 3     Sig: TAKE 1 TABLET BY MOUTH EVERY DAY

## 2023-01-24 ENCOUNTER — HOSPITAL ENCOUNTER (OUTPATIENT)
Age: 33
Discharge: HOME | End: 2023-01-24
Payer: COMMERCIAL

## 2023-01-24 ENCOUNTER — OFFICE VISIT (OUTPATIENT)
Dept: PRIMARY CARE CLINIC | Age: 33
End: 2023-01-24
Payer: COMMERCIAL

## 2023-01-24 ENCOUNTER — HOSPITAL ENCOUNTER (OUTPATIENT)
Dept: GENERAL RADIOLOGY | Age: 33
Discharge: HOME | End: 2023-01-24
Payer: COMMERCIAL

## 2023-01-24 VITALS
BODY MASS INDEX: 22.99 KG/M2 | SYSTOLIC BLOOD PRESSURE: 112 MMHG | WEIGHT: 138 LBS | HEART RATE: 76 BPM | TEMPERATURE: 97.2 F | HEIGHT: 65 IN | OXYGEN SATURATION: 98 % | DIASTOLIC BLOOD PRESSURE: 60 MMHG | RESPIRATION RATE: 16 BRPM

## 2023-01-24 DIAGNOSIS — G89.29 CHRONIC PAIN OF BOTH KNEES: ICD-10-CM

## 2023-01-24 DIAGNOSIS — M25.562 CHRONIC PAIN OF BOTH KNEES: Primary | ICD-10-CM

## 2023-01-24 DIAGNOSIS — M25.562 CHRONIC PAIN OF BOTH KNEES: ICD-10-CM

## 2023-01-24 DIAGNOSIS — M25.561 CHRONIC PAIN OF BOTH KNEES: ICD-10-CM

## 2023-01-24 DIAGNOSIS — G89.29 CHRONIC PAIN OF MULTIPLE JOINTS: ICD-10-CM

## 2023-01-24 DIAGNOSIS — G56.23 CUBITAL TUNNEL SYNDROME, BILATERAL: ICD-10-CM

## 2023-01-24 DIAGNOSIS — G89.29 CHRONIC PAIN OF BOTH KNEES: Primary | ICD-10-CM

## 2023-01-24 DIAGNOSIS — M25.561 CHRONIC PAIN OF BOTH KNEES: Primary | ICD-10-CM

## 2023-01-24 DIAGNOSIS — M25.50 CHRONIC PAIN OF MULTIPLE JOINTS: ICD-10-CM

## 2023-01-24 DIAGNOSIS — M25.542 ARTHRALGIA OF BOTH HANDS: ICD-10-CM

## 2023-01-24 DIAGNOSIS — M25.541 ARTHRALGIA OF BOTH HANDS: ICD-10-CM

## 2023-01-24 LAB
C-REACTIVE PROTEIN: <3 MG/L (ref 0–5.1)
RHEUMATOID FACTOR: 17 IU/ML
SEDIMENTATION RATE, ERYTHROCYTE: 12 MM/HR (ref 0–20)

## 2023-01-24 PROCEDURE — 86038 ANTINUCLEAR ANTIBODIES: CPT

## 2023-01-24 PROCEDURE — 73562 X-RAY EXAM OF KNEE 3: CPT

## 2023-01-24 PROCEDURE — 86200 CCP ANTIBODY: CPT

## 2023-01-24 PROCEDURE — 36415 COLL VENOUS BLD VENIPUNCTURE: CPT

## 2023-01-24 PROCEDURE — 1036F TOBACCO NON-USER: CPT | Performed by: STUDENT IN AN ORGANIZED HEALTH CARE EDUCATION/TRAINING PROGRAM

## 2023-01-24 PROCEDURE — G8427 DOCREV CUR MEDS BY ELIG CLIN: HCPCS | Performed by: STUDENT IN AN ORGANIZED HEALTH CARE EDUCATION/TRAINING PROGRAM

## 2023-01-24 PROCEDURE — G8484 FLU IMMUNIZE NO ADMIN: HCPCS | Performed by: STUDENT IN AN ORGANIZED HEALTH CARE EDUCATION/TRAINING PROGRAM

## 2023-01-24 PROCEDURE — 85652 RBC SED RATE AUTOMATED: CPT

## 2023-01-24 PROCEDURE — 86431 RHEUMATOID FACTOR QUANT: CPT

## 2023-01-24 PROCEDURE — G8420 CALC BMI NORM PARAMETERS: HCPCS | Performed by: STUDENT IN AN ORGANIZED HEALTH CARE EDUCATION/TRAINING PROGRAM

## 2023-01-24 PROCEDURE — 99214 OFFICE O/P EST MOD 30 MIN: CPT | Performed by: STUDENT IN AN ORGANIZED HEALTH CARE EDUCATION/TRAINING PROGRAM

## 2023-01-24 PROCEDURE — 86140 C-REACTIVE PROTEIN: CPT

## 2023-01-24 RX ORDER — LORATADINE 10 MG/1
TABLET ORAL
Qty: 30 TABLET | Refills: 3 | Status: SHIPPED | OUTPATIENT
Start: 2023-01-24

## 2023-01-24 RX ORDER — LORATADINE 10 MG/1
TABLET ORAL
Qty: 30 TABLET | Refills: 3 | OUTPATIENT
Start: 2023-01-24

## 2023-01-24 RX ORDER — MELOXICAM 15 MG/1
15 TABLET ORAL DAILY
Qty: 30 TABLET | Refills: 3 | Status: SHIPPED | OUTPATIENT
Start: 2023-01-24

## 2023-01-24 RX ORDER — FLUTICASONE PROPIONATE 50 MCG
2 SPRAY, SUSPENSION (ML) NASAL DAILY
Qty: 48 G | Refills: 1 | Status: SHIPPED | OUTPATIENT
Start: 2023-01-24

## 2023-01-24 RX ORDER — MELOXICAM 7.5 MG/1
7.5 TABLET ORAL DAILY
Qty: 90 TABLET | Refills: 1 | Status: CANCELLED | OUTPATIENT
Start: 2023-01-24

## 2023-01-24 SDOH — ECONOMIC STABILITY: FOOD INSECURITY: WITHIN THE PAST 12 MONTHS, THE FOOD YOU BOUGHT JUST DIDN'T LAST AND YOU DIDN'T HAVE MONEY TO GET MORE.: NEVER TRUE

## 2023-01-24 SDOH — ECONOMIC STABILITY: FOOD INSECURITY: WITHIN THE PAST 12 MONTHS, YOU WORRIED THAT YOUR FOOD WOULD RUN OUT BEFORE YOU GOT MONEY TO BUY MORE.: NEVER TRUE

## 2023-01-24 ASSESSMENT — ENCOUNTER SYMPTOMS
COUGH: 0
EYE REDNESS: 0
SHORTNESS OF BREATH: 0
RHINORRHEA: 0
EYE DISCHARGE: 0

## 2023-01-24 ASSESSMENT — ANXIETY QUESTIONNAIRES
3. WORRYING TOO MUCH ABOUT DIFFERENT THINGS: 0
4. TROUBLE RELAXING: 0
1. FEELING NERVOUS, ANXIOUS, OR ON EDGE: 0
GAD7 TOTAL SCORE: 0
7. FEELING AFRAID AS IF SOMETHING AWFUL MIGHT HAPPEN: 0
6. BECOMING EASILY ANNOYED OR IRRITABLE: 0
IF YOU CHECKED OFF ANY PROBLEMS ON THIS QUESTIONNAIRE, HOW DIFFICULT HAVE THESE PROBLEMS MADE IT FOR YOU TO DO YOUR WORK, TAKE CARE OF THINGS AT HOME, OR GET ALONG WITH OTHER PEOPLE: NOT DIFFICULT AT ALL
5. BEING SO RESTLESS THAT IT IS HARD TO SIT STILL: 0
2. NOT BEING ABLE TO STOP OR CONTROL WORRYING: 0

## 2023-01-24 ASSESSMENT — PATIENT HEALTH QUESTIONNAIRE - PHQ9
SUM OF ALL RESPONSES TO PHQ9 QUESTIONS 1 & 2: 0
8. MOVING OR SPEAKING SO SLOWLY THAT OTHER PEOPLE COULD HAVE NOTICED. OR THE OPPOSITE, BEING SO FIGETY OR RESTLESS THAT YOU HAVE BEEN MOVING AROUND A LOT MORE THAN USUAL: 0
1. LITTLE INTEREST OR PLEASURE IN DOING THINGS: 0
4. FEELING TIRED OR HAVING LITTLE ENERGY: 0
10. IF YOU CHECKED OFF ANY PROBLEMS, HOW DIFFICULT HAVE THESE PROBLEMS MADE IT FOR YOU TO DO YOUR WORK, TAKE CARE OF THINGS AT HOME, OR GET ALONG WITH OTHER PEOPLE: 0
2. FEELING DOWN, DEPRESSED OR HOPELESS: 0
7. TROUBLE CONCENTRATING ON THINGS, SUCH AS READING THE NEWSPAPER OR WATCHING TELEVISION: 0
6. FEELING BAD ABOUT YOURSELF - OR THAT YOU ARE A FAILURE OR HAVE LET YOURSELF OR YOUR FAMILY DOWN: 0
SUM OF ALL RESPONSES TO PHQ QUESTIONS 1-9: 0
SUM OF ALL RESPONSES TO PHQ QUESTIONS 1-9: 0
9. THOUGHTS THAT YOU WOULD BE BETTER OFF DEAD, OR OF HURTING YOURSELF: 0
SUM OF ALL RESPONSES TO PHQ QUESTIONS 1-9: 0
5. POOR APPETITE OR OVEREATING: 0
3. TROUBLE FALLING OR STAYING ASLEEP: 0
SUM OF ALL RESPONSES TO PHQ QUESTIONS 1-9: 0

## 2023-01-24 ASSESSMENT — SOCIAL DETERMINANTS OF HEALTH (SDOH): HOW HARD IS IT FOR YOU TO PAY FOR THE VERY BASICS LIKE FOOD, HOUSING, MEDICAL CARE, AND HEATING?: NOT HARD AT ALL

## 2023-01-24 NOTE — PROGRESS NOTES
[address] and [facility]                                             [address] [facility], [address] [place] [address]        Phone: [phone]      Name:  Hilda Zheng  :    1990    Consultants:   Patient Care Team:  Jarod Chawla DO as PCP - General (Family Medicine)    Chief Complaint:     Hilda Zheng is a 26 y.o. female  who presents today for an established patient care visit with Personalized Prevention Plan Services as noted below. HPI:    Hilda Zheng 26 y.o. female has Chronic pain of both knees; Arthralgia of both hands; Chronic pain of multiple joints; and Cubital tunnel syndrome, bilateral on their problem list.    Due to language barrier, an  was present, via Ipad, during the history-taking and subsequent discussion (and for part of the physical exam) with this patient. Patient seen previously for multiple joint pains some of which are chronic, cough, and night sweats. Quantiferon, TB negative. Cervical spine pain after fall has improved with PT but still bothers her. XR Cervical spine were unremarkable. Chronic bilateral knee pain  Possible hx of osteochondrosis as a child, diagnosed in [place]. She has had knee pain since she was 9years old. Her mother and daughter also have similar pain. Increased pain, bilateral knees, for 1 mo. She localizes pain to the popliteal fossa and joint line. She has pain that radiates from her knees into her thighs. She has both joint and muscle pain. She has trouble walking in the morning. More pain with weather changes. Massage helps. Advil helps. One tab for pain, not every day. Stairs and ROM. Laying down helps with the pain. Sitting for long periods of time causes numbness, bilateral lower legs. No locking from sit to stand. Popping and locking with waling worse in the morning.      Shoulder pain, wrist pain, hand pain, hand numbness, finger stiffness   Physical therapy has helped with minimally with the bilateral shoulder pain. No specific motions exacerbate shoulder pain. She has numbness bilateral hands. Numbness is worse if she sleeps with her arms bent. All the fingers are affected. She has also noticed relapsing and remitting stiffness in her hands. She takes Aleve periodically for joint pain but does not take this on a regular basis. Patient Active Problem List   Diagnosis    Chronic pain of both knees    Arthralgia of both hands    Chronic pain of multiple joints    Cubital tunnel syndrome, bilateral         Past Medical History:    History reviewed. No pertinent past medical history. Past Surgical History:  No past surgical history on file. Home Meds:  Prior to Visit Medications    Medication Sig Taking? Authorizing Provider   fluticasone (FLONASE) 50 MCG/ACT nasal spray 2 sprays by Each Nostril route daily Yes Magdelene K May, DO   loratadine (CLARITIN) 10 MG tablet TAKE 1 TABLET BY MOUTH EVERY DAY Yes Magdelene K May, DO   meloxicam (MOBIC) 15 MG tablet Take 1 tablet by mouth daily Yes Magdelene K May, DO   cetirizine (ZYRTEC) 10 MG tablet Take 10 mg by mouth daily Yes Historical Provider, MD       Allergies:    Patient has no known allergies.     Family History:       Problem Relation Age of Onset    No Known Problems Mother     No Known Problems Father          Health Maintenance Completed:  Health Maintenance   Topic Date Due    COVID-19 Vaccine (3 - Booster for Pfizer series) 09/20/2021    Flu vaccine (1) 08/01/2022    Varicella vaccine (1 of 2 - 2-dose childhood series) 03/07/2023 (Originally 1/6/1989)    DTaP/Tdap/Td vaccine (1 - Tdap) 03/29/2023 (Originally 1/6/2007)    Depression Screen  03/29/2023    Cervical cancer screen  04/12/2024    Hepatitis C screen  Completed    HIV screen  Completed    Hepatitis A vaccine  Aged Out    Hib vaccine  Aged Out    Meningococcal (ACWY) vaccine  Aged Out    Pneumococcal 0-64 years Vaccine  Aged [employer] History   Administered Date(s) Administered    COVID-19, PFIZER PURPLE top, DILUTE for use, (age 15 y+), 30mcg/0.3mL 06/30/2021, 07/26/2021    Influenza, FLUBLOK, (age 25 y+), PF, 0.5mL 03/15/2020         Review of Systems:  Review of Systems   Constitutional:  Negative for activity change, chills and fever. HENT:  Negative for congestion and rhinorrhea. Eyes:  Negative for discharge and redness. Respiratory:  Negative for cough and shortness of breath. Cardiovascular:  Negative for chest pain and palpitations. Genitourinary:  Negative for difficulty urinating and dysuria. Musculoskeletal:  Positive for arthralgias and myalgias. Neurological:  Negative for dizziness and syncope. Psychiatric/Behavioral:  Negative for agitation and behavioral problems. Physical Exam:   Vitals:    01/24/23 0835   BP: 112/60   Site: Left Upper Arm   Position: Sitting   Cuff Size: Medium Adult   Pulse: 76   Resp: 16   Temp: 97.2 °F (36.2 °C)   TempSrc: Temporal   SpO2: 98%   Weight: 138 lb (62.6 kg)   Height: 5' 5\" (1.651 m)     Body mass index is 22.96 kg/m². Wt Readings from Last 3 Encounters:   01/24/23 138 lb (62.6 kg)   03/29/22 128 lb 12.8 oz (58.4 kg)   07/31/21 135 lb (61.2 kg)       BP Readings from Last 3 Encounters:   01/24/23 112/60   03/29/22 98/62   08/05/21 110/72       Physical Exam  Constitutional:       Appearance: Normal appearance. HENT:      Head: Normocephalic and atraumatic. Nose: Nose normal. No congestion or rhinorrhea. Eyes:      Extraocular Movements: Extraocular movements intact. Conjunctiva/sclera: Conjunctivae normal.   Cardiovascular:      Rate and Rhythm: Normal rate and regular rhythm. Pulses: Normal pulses. Heart sounds: Normal heart sounds. Pulmonary:      Effort: Pulmonary effort is normal.      Breath sounds: Normal breath sounds.    Musculoskeletal:         General: Normal range of motion. Comments: Shoulder  Full range of motion and strength bilaterally  No tenderness to palpation  Negative Yergason's bilaterally   Equivocal empty can bilaterally  Positive Tinel's Bilateral Elbow  Positive Cross body on the right, negative on the left    Hand  Negative Tinel's bilaterally  No tenderness to palpation of the PIP, DIP, or MCP joints bilaterally  Negative 1st CMC grind bilaterally  Able to make a full fist bilaterally  Full strength of interosseous muscles bilaterally  Neurovascularly intact    Knees  Full range of motion bilaterally  Full strength bilaterally  Mild fullness popliteal fossa bilaterally but no knee effusion anterior knees  Bilateral Medial joint line tenderness  Positive patellar grind bilaterally  No gross instability  Normal Gait     Skin:     General: Skin is warm and dry. Neurological:      General: No focal deficit present. Mental Status: She is alert and oriented to person, place, and time. Psychiatric:         Mood and Affect: Mood normal.         Behavior: Behavior normal.            Lab Review: not applicable       Assessment/Plan:  Amber Valdez 26 y.o. female has Chronic pain of both knees; Arthralgia of both hands; Chronic pain of multiple joints; and Cubital tunnel syndrome, bilateral on their problem list.     Problem List          Nervous and Auditory    Cubital tunnel syndrome, bilateral      Uncontrolled, lifestyle modifications recommended   - ddx- carpal tunnel, cervical radiculopathy. Low suspicion for carpal tunnel as Tinel's negative.  Low suspicion for cervical radiculopathy as cervical spine XR unremarkable, however this is not the most sensitive imaging modality for spinal stenosis or disk herniation.   - Continue sleeping with arms straight  - EMG ordered today to include bilateral upper extremities and cervical spine  - f/u 6 weeks         Relevant Orders    External Referral To Neurology    EMG       Other    Chronic pain of both knees - Primary      Uncontrolled, changes made today: Start meloxicam   - Concern for hx of JIC w/ adult OCD vs rheumatoid arthritis vs osteoarthritis  - Recommend PT  - Labs to evaluate for rheumatoid arthritis   - Bilateral Knee XRs including standing and sunrise ordered today  - f/u 6 weeks         Relevant Medications    meloxicam (MOBIC) 15 MG tablet    Other Relevant Orders    RHEUMATOID FACTOR    C-Reactive Protein    CYCLIC CITRUL PEPTIDE ANTIBODY, IGG    Sedimentation Rate    CINDY Reflex to Antibody [place]    Arthralgia of both hands      Uncontrolled, changes made today: start meloxicam   - Concern for rheumatoid arthritis vs osteoarthritis vs myalgia vs atypical symptom of neuropathy   - Further work up pending labs  - f/u 6 weeks         Relevant Medications    meloxicam (MOBIC) 15 MG tablet    Other Relevant Orders    RHEUMATOID FACTOR    C-Reactive Protein    CYCLIC CITRUL PEPTIDE ANTIBODY, IGG    Sedimentation Rate    CINDY Reflex to Antibody [place]    Chronic pain of multiple joints    Relevant Medications    meloxicam (MOBIC) 15 MG tablet    Other Relevant Orders    RHEUMATOID FACTOR    C-Reactive Protein    CYCLIC CITRUL PEPTIDE ANTIBODY, IGG    Sedimentation Rate    CINDY Reflex to Antibody [place]         Health Maintenance Due:  Health Maintenance Due   Topic Date Due    COVID-19 Vaccine (3 - Booster for Pfizer series) 09/20/2021    Flu vaccine (1) 08/01/2022          RTC:  Return in about 4 weeks (around 2/21/2023). EMR Dragon/transcription disclaimer:  Much of this encounter note is electronic transcription/translation of spoken language to printed texts. The electronic translation of spoken language may be erroneous, or at times, nonsensical words or phrases may be inadvertently transcribed.   Although I have reviewed the note for such errors, some may still exist.

## 2023-01-24 NOTE — ASSESSMENT & PLAN NOTE
Uncontrolled, changes made today: Start meloxicam   - Concern for hx of JIC w/ adult OCD vs rheumatoid arthritis vs osteoarthritis  - Recommend PT  - Labs to evaluate for rheumatoid arthritis   - Bilateral Knee XRs including standing and sunrise ordered today  - f/u 6 weeks

## 2023-01-24 NOTE — ASSESSMENT & PLAN NOTE
Uncontrolled, lifestyle modifications recommended   - ddx- carpal tunnel, cervical radiculopathy. Low suspicion for carpal tunnel as Tinel's negative. Low suspicion for cervical radiculopathy as cervical spine XR unremarkable, however this is not the most sensitive imaging modality for spinal stenosis or disk herniation.   - Continue sleeping with arms straight  - EMG ordered today to include bilateral upper extremities and cervical spine  - f/u 6 weeks

## 2023-01-24 NOTE — ASSESSMENT & PLAN NOTE
Uncontrolled, changes made today: start meloxicam   - Concern for rheumatoid arthritis vs osteoarthritis vs myalgia vs atypical symptom of neuropathy   - Further work up pending labs  - f/u 6 weeks

## 2023-01-25 LAB
ANTI-NUCLEAR ANTIBODY (ANA): NEGATIVE
CYCLIC CITRULLINATED PEPTIDE ANTIBODY IGG: 0.7 U/ML (ref 0–2.9)

## 2023-03-03 DIAGNOSIS — T78.40XA ALLERGY, UNSPECIFIED, INITIAL ENCOUNTER: ICD-10-CM

## 2023-03-03 DIAGNOSIS — R09.81 NASAL CONGESTION: ICD-10-CM

## 2023-03-03 NOTE — TELEPHONE ENCOUNTER
Refill Request       Last Seen: Last Seen Department: 1/24/2023  Last Seen by PCP: 1/24/2023    Last Written: fluticasone (FLONASE) 50 MCG/ACT nasal spray-1/24/2023 48g with 1 refill     Next Appointment:   No future appointments.           Requested Prescriptions     Pending Prescriptions Disp Refills    fluticasone (FLONASE) 50 MCG/ACT nasal spray [Pharmacy Med Name: FLUTICASONE PROP 50 MCG SPRAY]  1     Sig: SPRAY 2 SPRAYS INTO EACH NOSTRIL EVERY DAY

## 2023-03-06 RX ORDER — FLUTICASONE PROPIONATE 50 MCG
SPRAY, SUSPENSION (ML) NASAL
Qty: 1 EACH | Refills: 3 | Status: SHIPPED | OUTPATIENT
Start: 2023-03-06

## 2023-07-11 ENCOUNTER — OFFICE VISIT (OUTPATIENT)
Dept: FAMILY MEDICINE CLINIC | Age: 33
End: 2023-07-11
Payer: COMMERCIAL

## 2023-07-11 VITALS
DIASTOLIC BLOOD PRESSURE: 66 MMHG | SYSTOLIC BLOOD PRESSURE: 92 MMHG | RESPIRATION RATE: 16 BRPM | WEIGHT: 137 LBS | BODY MASS INDEX: 23.39 KG/M2 | HEART RATE: 80 BPM | HEIGHT: 64 IN | OXYGEN SATURATION: 98 %

## 2023-07-11 DIAGNOSIS — M25.50 MULTIPLE JOINT PAIN: Primary | ICD-10-CM

## 2023-07-11 DIAGNOSIS — M25.512 CHRONIC LEFT SHOULDER PAIN: ICD-10-CM

## 2023-07-11 DIAGNOSIS — Z13.0 SCREENING FOR DEFICIENCY ANEMIA: ICD-10-CM

## 2023-07-11 DIAGNOSIS — M25.50 MULTIPLE JOINT PAIN: ICD-10-CM

## 2023-07-11 DIAGNOSIS — G89.29 CHRONIC LEFT SHOULDER PAIN: ICD-10-CM

## 2023-07-11 DIAGNOSIS — Z13.1 SCREENING FOR DIABETES MELLITUS: ICD-10-CM

## 2023-07-11 DIAGNOSIS — Z13.29 SCREENING FOR THYROID DISORDER: ICD-10-CM

## 2023-07-11 DIAGNOSIS — Z76.89 ENCOUNTER TO ESTABLISH CARE: ICD-10-CM

## 2023-07-11 DIAGNOSIS — J30.89 SEASONAL ALLERGIC RHINITIS DUE TO OTHER ALLERGIC TRIGGER: ICD-10-CM

## 2023-07-11 DIAGNOSIS — R76.8 ELEVATED RHEUMATOID FACTOR: Primary | ICD-10-CM

## 2023-07-11 PROCEDURE — 1036F TOBACCO NON-USER: CPT | Performed by: REGISTERED NURSE

## 2023-07-11 PROCEDURE — 99204 OFFICE O/P NEW MOD 45 MIN: CPT | Performed by: REGISTERED NURSE

## 2023-07-11 PROCEDURE — G8427 DOCREV CUR MEDS BY ELIG CLIN: HCPCS | Performed by: REGISTERED NURSE

## 2023-07-11 PROCEDURE — G8420 CALC BMI NORM PARAMETERS: HCPCS | Performed by: REGISTERED NURSE

## 2023-07-11 RX ORDER — LORATADINE 10 MG/1
TABLET ORAL
COMMUNITY
Start: 2023-04-27 | End: 2023-07-11 | Stop reason: SDUPTHER

## 2023-07-11 RX ORDER — FLUTICASONE PROPIONATE 50 MCG
2 SPRAY, SUSPENSION (ML) NASAL DAILY
Qty: 16 G | Refills: 5 | Status: SHIPPED | OUTPATIENT
Start: 2023-07-11

## 2023-07-11 RX ORDER — CETIRIZINE HYDROCHLORIDE 10 MG/1
10 TABLET ORAL DAILY
COMMUNITY

## 2023-07-11 RX ORDER — LORATADINE 10 MG/1
10 TABLET ORAL DAILY
Qty: 90 TABLET | Refills: 1 | Status: SHIPPED | OUTPATIENT
Start: 2023-07-11 | End: 2024-01-07

## 2023-07-11 RX ORDER — MELOXICAM 15 MG/1
15 TABLET ORAL DAILY PRN
Qty: 14 TABLET | Refills: 0 | Status: SHIPPED | OUTPATIENT
Start: 2023-07-11 | End: 2023-07-25

## 2023-07-11 ASSESSMENT — PATIENT HEALTH QUESTIONNAIRE - PHQ9
SUM OF ALL RESPONSES TO PHQ QUESTIONS 1-9: 0
2. FEELING DOWN, DEPRESSED OR HOPELESS: 0
SUM OF ALL RESPONSES TO PHQ9 QUESTIONS 1 & 2: 0
SUM OF ALL RESPONSES TO PHQ QUESTIONS 1-9: 0
1. LITTLE INTEREST OR PLEASURE IN DOING THINGS: 0

## 2023-07-11 ASSESSMENT — ENCOUNTER SYMPTOMS
SHORTNESS OF BREATH: 0
GASTROINTESTINAL NEGATIVE: 1
EYES NEGATIVE: 1
BACK PAIN: 1

## 2023-07-11 NOTE — PROGRESS NOTES
Patient: Garrison Barnett is a 26 y.o. female who presents today with the following Chief Complaint(s):  Chief Complaint   Patient presents with    New Patient     Pain all over body, ongoing since childhood       Assessment/Plan:    Main focus for visit today is persistent pain. 1. Multiple joint pain  She reports pain in her legs, knees, bones, shoulders neck and back. She says she has had blood work and imaging completed to evaluate the symptoms in the past year. No record of these tests available in her current chart. She does not recall the provider she was seeing. Due to her records and persistent symptoms plan is blood work to rule underlying etiology. Her pain seem to be multifaceted-see note below regarding shoulder pain. - Comprehensive Metabolic Panel; Future  - RHEUMATOID FACTOR; Future  - Sedimentation Rate; Future    2. Screening for deficiency anemia    - CBC with Auto Differential; Future    3. Screening for diabetes mellitus    - Hemoglobin A1C; Future    4. Screening for thyroid disorder    - TSH with Reflex to FT4; Future    5. Seasonal allergic rhinitis due to other allergic trigger  She would like a refill for loratadine.  - loratadine (CLARITIN) 10 MG tablet; Take 1 tablet by mouth daily  Dispense: 90 tablet; Refill: 1    6. Chronic left shoulder pain  Most of her pain is in her left shoulder, left upper arm and left upper back. Suspect right shoulder etiology. She does not have history of injury or trauma. She says most of her pain is when is laying down. Due to her persistent symptoms plan is to start meloxicam for 14-day course. Recommended cervical exercises and shoulder stretches. She says she has been to physical therapy previously and does do some exercises but says she has not found these have relieved her pain long-term. Referred to Ortho for further evaluation.  - Kash Steele MD, Orthopedic Surgery (Hip; Knee;  Shoulder), [place]  - meloxicam (MOBIC)

## 2023-07-12 LAB
ALBUMIN SERPL-MCNC: 4.7 G/DL (ref 3.4–5)
ALBUMIN/GLOB SERPL: 1.8 {RATIO} (ref 1.1–2.2)
ALP SERPL-CCNC: 66 U/L (ref 40–129)
ALT SERPL-CCNC: 16 U/L (ref 10–40)
ANION GAP SERPL CALCULATED.3IONS-SCNC: 10 MMOL/L (ref 3–16)
AST SERPL-CCNC: 16 U/L (ref 15–37)
BASOPHILS # BLD: 0.1 K/UL (ref 0–0.2)
BASOPHILS NFR BLD: 0.8 %
BILIRUB SERPL-MCNC: 0.3 MG/DL (ref 0–1)
BUN SERPL-MCNC: 12 MG/DL (ref 7–20)
CALCIUM SERPL-MCNC: 9.8 MG/DL (ref 8.3–10.6)
CHLORIDE SERPL-SCNC: 103 MMOL/L (ref 99–110)
CO2 SERPL-SCNC: 26 MMOL/L (ref 21–32)
CREAT SERPL-MCNC: 0.6 MG/DL (ref 0.6–1.1)
DEPRECATED RDW RBC AUTO: 13.9 % (ref 12.4–15.4)
EOSINOPHIL # BLD: 0.3 K/UL (ref 0–0.6)
EOSINOPHIL NFR BLD: 3.3 %
ERYTHROCYTE [SEDIMENTATION RATE] IN BLOOD BY WESTERGREN METHOD: 15 MM/HR (ref 0–20)
EST. AVERAGE GLUCOSE BLD GHB EST-MCNC: 102.5 MG/DL
GFR SERPLBLD CREATININE-BSD FMLA CKD-EPI: >60 ML/MIN/{1.73_M2}
GLUCOSE SERPL-MCNC: 93 MG/DL (ref 70–99)
HBA1C MFR BLD: 5.2 %
HCT VFR BLD AUTO: 40.8 % (ref 36–48)
HGB BLD-MCNC: 13.7 G/DL (ref 12–16)
LYMPHOCYTES # BLD: 2 K/UL (ref 1–5.1)
LYMPHOCYTES NFR BLD: 22.9 %
MCH RBC QN AUTO: 29.2 PG (ref 26–34)
MCHC RBC AUTO-ENTMCNC: 33.5 G/DL (ref 31–36)
MCV RBC AUTO: 87 FL (ref 80–100)
MONOCYTES # BLD: 0.5 K/UL (ref 0–1.3)
MONOCYTES NFR BLD: 5.6 %
NEUTROPHILS # BLD: 5.9 K/UL (ref 1.7–7.7)
NEUTROPHILS NFR BLD: 67.4 %
PLATELET # BLD AUTO: 296 K/UL (ref 135–450)
PMV BLD AUTO: 10.2 FL (ref 5–10.5)
POTASSIUM SERPL-SCNC: 4.7 MMOL/L (ref 3.5–5.1)
PROT SERPL-MCNC: 7.3 G/DL (ref 6.4–8.2)
RBC # BLD AUTO: 4.69 M/UL (ref 4–5.2)
RHEUMATOID FACT SER IA-ACNC: 21 IU/ML
SODIUM SERPL-SCNC: 139 MMOL/L (ref 136–145)
TSH SERPL DL<=0.005 MIU/L-ACNC: 1.17 UIU/ML (ref 0.27–4.2)
WBC # BLD AUTO: 8.7 K/UL (ref 4–11)

## 2023-07-17 ENCOUNTER — TELEPHONE (OUTPATIENT)
Dept: FAMILY MEDICINE CLINIC | Age: 33
End: 2023-07-17

## 2023-07-17 NOTE — TELEPHONE ENCOUNTER
----- Message from Eric Mccain sent at 7/17/2023  4:11 PM EDT -----  Subject: Message to Provider    QUESTIONS  Information for Provider? pt is wanting to know what maikel called her   about. please call her back when you're back in office.  thanks  ---------------------------------------------------------------------------  --------------  Noris Rogers INFO  [phone]; OK to leave message on voicemail  ---------------------------------------------------------------------------  --------------  SCRIPT ANSWERS  undefined

## 2023-07-20 RX ORDER — PREDNISONE 10 MG/1
TABLET ORAL
Qty: 26 TABLET | Refills: 0 | Status: SHIPPED | OUTPATIENT
Start: 2023-07-20 | End: 2023-08-05

## 2023-07-20 NOTE — TELEPHONE ENCOUNTER
Pt notified and verbalized understanding. States she would like the steroid sent to [facility] on [address].

## 2023-07-20 NOTE — TELEPHONE ENCOUNTER
Pt called to say that the rheumatologist that she was referred to does not take her insurance. She would like another referral to a different rheumatologist.    Also pt stated she would like a call back today 07.20.23 as she is in pain and wants the new referral as soon as possible.

## 2023-07-20 NOTE — TELEPHONE ENCOUNTER
I can prescribe her a course of steroids. This may help her pain temporarily but will not cure her, so pain may return once the steroids are completed.

## 2023-08-15 ENCOUNTER — HOSPITAL ENCOUNTER (EMERGENCY)
Age: 33
Discharge: HOME | End: 2023-08-16
Payer: COMMERCIAL

## 2023-08-15 DIAGNOSIS — R51.9 NONINTRACTABLE HEADACHE, UNSPECIFIED CHRONICITY PATTERN, UNSPECIFIED HEADACHE TYPE: Primary | ICD-10-CM

## 2023-08-15 DIAGNOSIS — R11.0 NAUSEA: ICD-10-CM

## 2023-08-15 LAB
B-HCG SERPL EIA 3RD IS-ACNC: <5 MIU/ML
BILIRUB UR QL STRIP.AUTO: NEGATIVE
CLARITY UR: CLEAR
COLOR UR: YELLOW
EPI CELLS #/AREA URNS HPF: NORMAL /HPF (ref 0–5)
GLUCOSE UR STRIP.AUTO-MCNC: NEGATIVE MG/DL
HGB UR QL STRIP.AUTO: ABNORMAL
KETONES UR STRIP.AUTO-MCNC: NEGATIVE MG/DL
LEUKOCYTE ESTERASE UR QL STRIP.AUTO: NEGATIVE
NITRITE UR QL STRIP.AUTO: NEGATIVE
PH UR STRIP.AUTO: 6.5 [PH] (ref 5–8)
PROT UR STRIP.AUTO-MCNC: NEGATIVE MG/DL
RBC #/AREA URNS HPF: NORMAL /HPF (ref 0–4)
SP GR UR STRIP.AUTO: 1.01 (ref 1–1.03)
UA DIPSTICK W REFLEX MICRO PNL UR: YES
URN SPEC COLLECT METH UR: ABNORMAL
UROBILINOGEN UR STRIP-ACNC: 0.2 E.U./DL
WBC #/AREA URNS HPF: NORMAL /HPF (ref 0–5)

## 2023-08-15 PROCEDURE — 81001 URINALYSIS AUTO W/SCOPE: CPT

## 2023-08-15 PROCEDURE — 87636 SARSCOV2 & INF A&B AMP PRB: CPT

## 2023-08-15 PROCEDURE — 2580000003 HC RX 258: Performed by: PHYSICIAN ASSISTANT

## 2023-08-15 PROCEDURE — 99284 EMERGENCY DEPT VISIT MOD MDM: CPT

## 2023-08-15 PROCEDURE — 6360000002 HC RX W HCPCS: Performed by: PHYSICIAN ASSISTANT

## 2023-08-15 PROCEDURE — 6370000000 HC RX 637 (ALT 250 FOR IP): Performed by: PHYSICIAN ASSISTANT

## 2023-08-15 PROCEDURE — 84702 CHORIONIC GONADOTROPIN TEST: CPT

## 2023-08-15 RX ORDER — ONDANSETRON 2 MG/ML
4 INJECTION INTRAMUSCULAR; INTRAVENOUS ONCE
Status: COMPLETED | OUTPATIENT
Start: 2023-08-15 | End: 2023-08-15

## 2023-08-15 RX ORDER — 0.9 % SODIUM CHLORIDE 0.9 %
1000 INTRAVENOUS SOLUTION INTRAVENOUS ONCE
Status: COMPLETED | OUTPATIENT
Start: 2023-08-15 | End: 2023-08-16

## 2023-08-15 RX ORDER — SUMATRIPTAN 6 MG/.5ML
6 INJECTION, SOLUTION SUBCUTANEOUS ONCE
Status: COMPLETED | OUTPATIENT
Start: 2023-08-15 | End: 2023-08-15

## 2023-08-15 RX ADMIN — ONDANSETRON 4 MG: 2 INJECTION INTRAMUSCULAR; INTRAVENOUS at 23:21

## 2023-08-15 RX ADMIN — SUMATRIPTAN SUCCINATE 6 MG: 6 INJECTION SUBCUTANEOUS at 23:21

## 2023-08-15 RX ADMIN — SODIUM CHLORIDE 1000 ML: 9 INJECTION, SOLUTION INTRAVENOUS at 23:21

## 2023-08-15 ASSESSMENT — PAIN DESCRIPTION - LOCATION: LOCATION: HEAD

## 2023-08-15 ASSESSMENT — PAIN SCALES - GENERAL: PAINLEVEL_OUTOF10: 8

## 2023-08-15 ASSESSMENT — PAIN - FUNCTIONAL ASSESSMENT: PAIN_FUNCTIONAL_ASSESSMENT: 0-10

## 2023-08-16 VITALS
TEMPERATURE: 97.6 F | HEIGHT: 64 IN | WEIGHT: 165 LBS | BODY MASS INDEX: 28.17 KG/M2 | DIASTOLIC BLOOD PRESSURE: 51 MMHG | RESPIRATION RATE: 16 BRPM | OXYGEN SATURATION: 100 % | SYSTOLIC BLOOD PRESSURE: 127 MMHG | HEART RATE: 62 BPM

## 2023-08-16 LAB
FLUAV RNA RESP QL NAA+PROBE: NOT DETECTED
FLUBV RNA RESP QL NAA+PROBE: NOT DETECTED
SARS-COV-2 RNA RESP QL NAA+PROBE: NOT DETECTED

## 2023-08-16 RX ORDER — ONDANSETRON 4 MG/1
4 TABLET, FILM COATED ORAL DAILY PRN
Qty: 30 TABLET | Refills: 0 | Status: SHIPPED | OUTPATIENT
Start: 2023-08-16

## 2023-08-16 RX ORDER — SUMATRIPTAN 50 MG/1
50 TABLET, FILM COATED ORAL
Qty: 9 TABLET | Refills: 0 | Status: SHIPPED | OUTPATIENT
Start: 2023-08-16 | End: 2023-08-16

## 2023-08-16 NOTE — ED PROVIDER NOTES
tobacco: Never   Vaping Use    Vaping Use: Never used   Substance and Sexual Activity    Alcohol use: Never    Drug use: Never    Sexual activity: Yes     Partners: Male     Birth control/protection: Implant     Comment: nexplanon   Other Topics Concern    Not on file   Social History Narrative    ** Merged History Encounter **          Social Determinants of Health     Financial Resource Strain: Low Risk     Difficulty of Paying Living Expenses: Not hard at all   Food Insecurity: No Food Insecurity    Worried About Running Out of Food in the Last Year: Never true    Ran Out of Food in the Last Year: Never true   Transportation Needs: Not on file   Physical Activity: Not on file   Stress: Not on file   Social Connections: Not on file   Intimate Partner Violence: Not on file   Housing Stability: Not on file     Current Medications:  No current facility-administered medications for this encounter.      Current Outpatient Medications   Medication Sig Dispense Refill    SUMAtriptan (IMITREX) 50 MG tablet Take 1 tablet by mouth once as needed for Migraine 9 tablet 0    ondansetron (ZOFRAN) 4 MG tablet Take 1 tablet by mouth daily as needed for Nausea or Vomiting 30 tablet 0    cetirizine (ZYRTEC) 10 MG tablet Take 1 tablet by mouth daily      loratadine (CLARITIN) 10 MG tablet Take 1 tablet by mouth daily 90 tablet 1    meloxicam (MOBIC) 15 MG tablet Take 1 tablet by mouth daily as needed for Pain 14 tablet 0    fluticasone (FLONASE) 50 MCG/ACT nasal spray 2 sprays by Each Nostril route daily 16 g 5    fluticasone (FLONASE) 50 MCG/ACT nasal spray SPRAY 2 SPRAYS INTO EACH NOSTRIL EVERY DAY 1 each 3    loratadine (CLARITIN) 10 MG tablet TAKE 1 TABLET BY MOUTH EVERY DAY 30 tablet 3    meloxicam (MOBIC) 15 MG tablet Take 1 tablet by mouth daily 30 tablet 3    cetirizine (ZYRTEC) 10 MG tablet Take 10 mg by mouth daily       Allergies:  No Known Allergies  Screenings:                 [facility] Assessment  BP: (!) 127/51  Pulse: 62

## 2023-10-18 ENCOUNTER — OFFICE VISIT (OUTPATIENT)
Dept: FAMILY MEDICINE CLINIC | Age: 33
End: 2023-10-18
Payer: COMMERCIAL

## 2023-10-18 VITALS
DIASTOLIC BLOOD PRESSURE: 62 MMHG | WEIGHT: 144 LBS | OXYGEN SATURATION: 98 % | SYSTOLIC BLOOD PRESSURE: 88 MMHG | BODY MASS INDEX: 24.59 KG/M2 | HEIGHT: 64 IN | HEART RATE: 76 BPM | RESPIRATION RATE: 16 BRPM

## 2023-10-18 DIAGNOSIS — M54.41 ACUTE BILATERAL LOW BACK PAIN WITH RIGHT-SIDED SCIATICA: Primary | ICD-10-CM

## 2023-10-18 PROCEDURE — G8420 CALC BMI NORM PARAMETERS: HCPCS | Performed by: REGISTERED NURSE

## 2023-10-18 PROCEDURE — G8484 FLU IMMUNIZE NO ADMIN: HCPCS | Performed by: REGISTERED NURSE

## 2023-10-18 PROCEDURE — G8427 DOCREV CUR MEDS BY ELIG CLIN: HCPCS | Performed by: REGISTERED NURSE

## 2023-10-18 PROCEDURE — 99213 OFFICE O/P EST LOW 20 MIN: CPT | Performed by: REGISTERED NURSE

## 2023-10-18 PROCEDURE — 1036F TOBACCO NON-USER: CPT | Performed by: REGISTERED NURSE

## 2023-10-18 RX ORDER — TIZANIDINE 2 MG/1
2 TABLET ORAL NIGHTLY PRN
Qty: 30 TABLET | Refills: 0 | Status: SHIPPED | OUTPATIENT
Start: 2023-10-18

## 2023-10-18 RX ORDER — METHYLPREDNISOLONE 4 MG/1
TABLET ORAL
Qty: 21 TABLET | Refills: 0 | Status: SHIPPED | OUTPATIENT
Start: 2023-10-18

## 2023-10-18 ASSESSMENT — ENCOUNTER SYMPTOMS
GASTROINTESTINAL NEGATIVE: 1
BOWEL INCONTINENCE: 0
BACK PAIN: 1
SHORTNESS OF BREATH: 0

## 2023-10-18 NOTE — PROGRESS NOTES
Patient: Karmen Meadows is a 26 y.o. female who presents today with the following Chief Complaint(s):  Chief Complaint   Patient presents with    Back Pain     And neck pain, lower back, x1 month       Assessment/Plan:    1. Acute bilateral low back pain with right-sided sciatica  Back pain with right sciatica- no history of injury or trauma. This is likely due to posture and being seated and driving for long periods. Plan is oral steroids, tizanidine at night. Back exercises as prescribed. A foam wedge for car for proper spine alignment would beneficial.   - methylPREDNISolone (MEDROL, JOSE A,) 4 MG tablet; Take by mouth. Take 6 tabs on day 1, 5 tabs on day 2, 4 tabs on day 3, 3 tabs on day 4, 2 tabs on day 5, 1 tab on day 6  Dispense: 21 tablet; Refill: 0  - tiZANidine (ZANAFLEX) 2 MG tablet; Take 1 tablet by mouth nightly as needed (muscle spasm/back pain)  Dispense: 30 tablet; Refill: 0      Return if symptoms worsen or fail to improve. HPI:     She is here for back pain for a few months. She drives for a living. It hurts when sitting and lying down. It feels better to stand and walk. The pain is worse in the afternoons after she has been sitting more. She has pain that radiates down her right leg. She has not taken anything for the pain. Back Pain  This is a recurrent problem. The current episode started more than 1 month ago. The problem has been waxing and waning since onset. The pain is present in the lumbar spine. The quality of the pain is described as aching and shooting. The pain radiates to the right thigh. The pain is at a severity of 7/10. The pain is moderate. The pain is Worse during the day. The symptoms are aggravated by position, sitting and lying down. Associated symptoms include leg pain (intermittent). Pertinent negatives include no bladder incontinence, bowel incontinence, chest pain, dysuria, fever, headaches, numbness, paresthesias, pelvic pain or weakness.  Risk factors include

## 2023-10-18 NOTE — PATIENT INSTRUCTIONS
Medrol dose pack as directed. Tizanidine- muscle relaxer at night. May cause drowsiness. Back exercises daily.

## 2023-11-22 DIAGNOSIS — M54.41 ACUTE BILATERAL LOW BACK PAIN WITH RIGHT-SIDED SCIATICA: ICD-10-CM

## 2023-11-22 RX ORDER — TIZANIDINE 2 MG/1
2 TABLET ORAL NIGHTLY PRN
Qty: 30 TABLET | Refills: 0 | OUTPATIENT
Start: 2023-11-22

## 2023-11-22 NOTE — TELEPHONE ENCOUNTER
Last Office Visit  -  10/18/2023  Next Office Visit  -  n/a    Last Filled  -    Last UDS -    Contract -

## 2023-12-27 DIAGNOSIS — M54.41 ACUTE BILATERAL LOW BACK PAIN WITH RIGHT-SIDED SCIATICA: ICD-10-CM

## 2023-12-27 RX ORDER — TIZANIDINE 2 MG/1
2 TABLET ORAL NIGHTLY PRN
Qty: 30 TABLET | Refills: 0 | Status: SHIPPED | OUTPATIENT
Start: 2023-12-27

## 2024-01-06 DIAGNOSIS — J30.89 OTHER ALLERGIC RHINITIS: ICD-10-CM

## 2024-01-08 RX ORDER — LORATADINE 10 MG/1
TABLET ORAL
Qty: 90 TABLET | Refills: 0 | Status: SHIPPED | OUTPATIENT
Start: 2024-01-08

## 2024-01-08 NOTE — TELEPHONE ENCOUNTER
Last Office Visit  -  10/18/23  Next Office Visit  -  n/a    Last Filled  -  1/24/23  Last UDS -    Contract -

## 2024-04-24 ENCOUNTER — TRANSCRIBE ORDERS (OUTPATIENT)
Dept: ADMINISTRATIVE | Age: 34
End: 2024-04-24

## 2024-04-24 DIAGNOSIS — N63.14 MASS OF LOWER INNER QUADRANT OF RIGHT BREAST: Primary | ICD-10-CM

## 2024-05-07 ENCOUNTER — HOSPITAL ENCOUNTER (OUTPATIENT)
Dept: WOMENS IMAGING | Age: 34
Discharge: HOME | End: 2024-05-07
Payer: COMMERCIAL

## 2024-05-07 VITALS — BODY MASS INDEX: 23.05 KG/M2 | HEIGHT: 64 IN | WEIGHT: 135 LBS

## 2024-05-07 DIAGNOSIS — N63.14 MASS OF LOWER INNER QUADRANT OF RIGHT BREAST: ICD-10-CM

## 2024-05-07 PROCEDURE — G0279 TOMOSYNTHESIS, MAMMO: HCPCS

## 2024-05-07 PROCEDURE — 76642 ULTRASOUND BREAST LIMITED: CPT

## 2024-07-06 DIAGNOSIS — J30.89 OTHER ALLERGIC RHINITIS: ICD-10-CM

## 2024-07-07 RX ORDER — LORATADINE 10 MG/1
TABLET ORAL
Qty: 90 TABLET | Refills: 0 | Status: SHIPPED | OUTPATIENT
Start: 2024-07-07

## 2024-10-03 DIAGNOSIS — J30.89 OTHER ALLERGIC RHINITIS: ICD-10-CM

## 2024-10-03 RX ORDER — LORATADINE 10 MG/1
TABLET ORAL
Qty: 90 TABLET | Refills: 3 | Status: SHIPPED | OUTPATIENT
Start: 2024-10-03

## 2024-11-07 ENCOUNTER — OFFICE VISIT (OUTPATIENT)
Dept: FAMILY MEDICINE CLINIC | Age: 34
End: 2024-11-07

## 2024-11-07 VITALS
DIASTOLIC BLOOD PRESSURE: 64 MMHG | WEIGHT: 135 LBS | HEART RATE: 87 BPM | OXYGEN SATURATION: 97 % | BODY MASS INDEX: 23.17 KG/M2 | SYSTOLIC BLOOD PRESSURE: 94 MMHG

## 2024-11-07 DIAGNOSIS — Z13.1 SCREENING FOR DIABETES MELLITUS: ICD-10-CM

## 2024-11-07 DIAGNOSIS — Z13.220 SCREENING FOR HYPERLIPIDEMIA: ICD-10-CM

## 2024-11-07 DIAGNOSIS — Z13.21 ENCOUNTER FOR VITAMIN DEFICIENCY SCREENING: ICD-10-CM

## 2024-11-07 DIAGNOSIS — Z00.00 ENCOUNTER FOR WELL ADULT EXAM WITHOUT ABNORMAL FINDINGS: Primary | ICD-10-CM

## 2024-11-07 DIAGNOSIS — I83.93 SPIDER VEINS OF BOTH LOWER EXTREMITIES: ICD-10-CM

## 2024-11-07 DIAGNOSIS — R06.2 WHEEZING: ICD-10-CM

## 2024-11-07 DIAGNOSIS — Z13.29 SCREENING FOR THYROID DISORDER: ICD-10-CM

## 2024-11-07 DIAGNOSIS — J30.89 SEASONAL ALLERGIC RHINITIS DUE TO OTHER ALLERGIC TRIGGER: ICD-10-CM

## 2024-11-07 DIAGNOSIS — Z23 NEED FOR TDAP VACCINATION: ICD-10-CM

## 2024-11-07 DIAGNOSIS — R53.83 OTHER FATIGUE: ICD-10-CM

## 2024-11-07 DIAGNOSIS — Z23 NEED FOR INFLUENZA VACCINATION: ICD-10-CM

## 2024-11-07 RX ORDER — LORATADINE 10 MG/1
TABLET ORAL
Qty: 90 TABLET | Refills: 3 | Status: SHIPPED | OUTPATIENT
Start: 2024-11-07

## 2024-11-07 RX ORDER — AZELASTINE 1 MG/ML
1 SPRAY, METERED NASAL 2 TIMES DAILY
Qty: 60 ML | Refills: 5 | Status: SHIPPED | OUTPATIENT
Start: 2024-11-07

## 2024-11-07 RX ORDER — ALBUTEROL SULFATE 90 UG/1
2 INHALANT RESPIRATORY (INHALATION) 4 TIMES DAILY PRN
Qty: 18 G | Refills: 1 | Status: SHIPPED | OUTPATIENT
Start: 2024-11-07

## 2024-11-07 SDOH — ECONOMIC STABILITY: FOOD INSECURITY: WITHIN THE PAST 12 MONTHS, THE FOOD YOU BOUGHT JUST DIDN'T LAST AND YOU DIDN'T HAVE MONEY TO GET MORE.: NEVER TRUE

## 2024-11-07 SDOH — ECONOMIC STABILITY: INCOME INSECURITY: HOW HARD IS IT FOR YOU TO PAY FOR THE VERY BASICS LIKE FOOD, HOUSING, MEDICAL CARE, AND HEATING?: NOT VERY HARD

## 2024-11-07 SDOH — ECONOMIC STABILITY: FOOD INSECURITY: WITHIN THE PAST 12 MONTHS, YOU WORRIED THAT YOUR FOOD WOULD RUN OUT BEFORE YOU GOT MONEY TO BUY MORE.: NEVER TRUE

## 2024-11-07 ASSESSMENT — PATIENT HEALTH QUESTIONNAIRE - PHQ9
SUM OF ALL RESPONSES TO PHQ QUESTIONS 1-9: 0
SUM OF ALL RESPONSES TO PHQ9 QUESTIONS 1 & 2: 0
SUM OF ALL RESPONSES TO PHQ QUESTIONS 1-9: 0
2. FEELING DOWN, DEPRESSED OR HOPELESS: NOT AT ALL
SUM OF ALL RESPONSES TO PHQ QUESTIONS 1-9: 0
1. LITTLE INTEREST OR PLEASURE IN DOING THINGS: NOT AT ALL
SUM OF ALL RESPONSES TO PHQ QUESTIONS 1-9: 0

## 2024-11-07 ASSESSMENT — ENCOUNTER SYMPTOMS
WHEEZING: 1
GASTROINTESTINAL NEGATIVE: 1
EYES NEGATIVE: 1
BACK PAIN: 0

## 2024-11-07 NOTE — PROGRESS NOTES
each nostril as directed  Dispense: 60 mL; Refill: 5    10. Need for Tdap vaccination  Reviewed risk/benefits/potential side effects of Tdap vaccination.  She would like to update this today.  - Tdap, BOOSTRIX, (age 10 yrs+), IM    11. Need for influenza vaccination  Reviewed risk/benefits/potential side effects of influenza vaccination.  She would like to update this today.  - Influenza, FLUCELVAX Trivalent, (age 6 mo+) IM, Preservative Free, 0.5mL      Return in about 1 year (around 11/7/2025), or if symptoms worsen or fail to improve, for Annual check up.    HPI:     She is here for annual physical.  She has history of chronic knee pain, chronic joint pain, seasonal/environmental allergies, arthralgias bilateral hands.    She has noticed that she has wheezing with activity.  She exercises or plays with her children she feels short of breath and wheezes.  She had pulmonary function testing done about a year ago and testing was normal.    She has veins on the back of her legs which bulging get larger when she is walking for long periods of time.  This is painful.        Current Outpatient Medications   Medication Sig Dispense Refill    albuterol sulfate HFA (VENTOLIN HFA) 108 (90 Base) MCG/ACT inhaler Inhale 2 puffs into the lungs 4 times daily as needed for Wheezing 18 g 1    loratadine (CLARITIN) 10 MG tablet TAKE 1 TABLET BY MOUTH EVERY DAY 90 tablet 3    azelastine (ASTELIN) 0.1 % nasal spray 1 spray by Nasal route 2 times daily Use in each nostril as directed 60 mL 5     No current facility-administered medications for this visit.       Review of Systems   Constitutional:  Positive for fatigue. Negative for activity change and appetite change.   HENT: Negative.     Eyes: Negative.    Respiratory:  Positive for wheezing.    Cardiovascular: Negative.  Negative for chest pain.   Gastrointestinal: Negative.    Endocrine: Negative.    Genitourinary: Negative.    Musculoskeletal:  Positive for arthralgias and

## 2024-11-08 DIAGNOSIS — E55.9 VITAMIN D DEFICIENCY: Primary | ICD-10-CM

## 2024-11-08 LAB
25(OH)D3 SERPL-MCNC: 11.3 NG/ML
ALBUMIN SERPL-MCNC: 4.8 G/DL (ref 3.4–5)
ALBUMIN/GLOB SERPL: 1.8 {RATIO} (ref 1.1–2.2)
ALP SERPL-CCNC: 71 U/L (ref 40–129)
ALT SERPL-CCNC: 25 U/L (ref 10–40)
ANION GAP SERPL CALCULATED.3IONS-SCNC: 14 MMOL/L (ref 3–16)
AST SERPL-CCNC: 23 U/L (ref 15–37)
BASOPHILS # BLD: 0 K/UL (ref 0–0.2)
BASOPHILS NFR BLD: 0.4 %
BILIRUB SERPL-MCNC: 0.4 MG/DL (ref 0–1)
BUN SERPL-MCNC: 13 MG/DL (ref 7–20)
CALCIUM SERPL-MCNC: 9.8 MG/DL (ref 8.3–10.6)
CHLORIDE SERPL-SCNC: 101 MMOL/L (ref 99–110)
CHOLEST SERPL-MCNC: 267 MG/DL (ref 0–199)
CO2 SERPL-SCNC: 21 MMOL/L (ref 21–32)
CREAT SERPL-MCNC: 0.6 MG/DL (ref 0.6–1.1)
DEPRECATED RDW RBC AUTO: 13.6 % (ref 12.4–15.4)
EOSINOPHIL # BLD: 0.3 K/UL (ref 0–0.6)
EOSINOPHIL NFR BLD: 3.5 %
EST. AVERAGE GLUCOSE BLD GHB EST-MCNC: 102.5 MG/DL
GFR SERPLBLD CREATININE-BSD FMLA CKD-EPI: >90 ML/MIN/{1.73_M2}
GLUCOSE SERPL-MCNC: 75 MG/DL (ref 70–99)
HBA1C MFR BLD: 5.2 %
HCT VFR BLD AUTO: 39.4 % (ref 36–48)
HDLC SERPL-MCNC: 57 MG/DL (ref 40–60)
HGB BLD-MCNC: 13.6 G/DL (ref 12–16)
LDLC SERPL CALC-MCNC: 184 MG/DL
LYMPHOCYTES # BLD: 2.2 K/UL (ref 1–5.1)
LYMPHOCYTES NFR BLD: 23.8 %
MCH RBC QN AUTO: 29.8 PG (ref 26–34)
MCHC RBC AUTO-ENTMCNC: 34.6 G/DL (ref 31–36)
MCV RBC AUTO: 86.2 FL (ref 80–100)
MONOCYTES # BLD: 0.7 K/UL (ref 0–1.3)
MONOCYTES NFR BLD: 7.1 %
NEUTROPHILS # BLD: 6 K/UL (ref 1.7–7.7)
NEUTROPHILS NFR BLD: 65.2 %
PLATELET # BLD AUTO: 317 K/UL (ref 135–450)
PMV BLD AUTO: 10.2 FL (ref 5–10.5)
POTASSIUM SERPL-SCNC: 3.9 MMOL/L (ref 3.5–5.1)
PROT SERPL-MCNC: 7.5 G/DL (ref 6.4–8.2)
RBC # BLD AUTO: 4.57 M/UL (ref 4–5.2)
SODIUM SERPL-SCNC: 136 MMOL/L (ref 136–145)
TRIGL SERPL-MCNC: 131 MG/DL (ref 0–150)
TSH SERPL DL<=0.005 MIU/L-ACNC: 1.27 UIU/ML (ref 0.27–4.2)
VLDLC SERPL CALC-MCNC: 26 MG/DL
WBC # BLD AUTO: 9.3 K/UL (ref 4–11)

## 2024-11-08 RX ORDER — ERGOCALCIFEROL 1.25 MG/1
50000 CAPSULE, LIQUID FILLED ORAL WEEKLY
Qty: 12 CAPSULE | Refills: 1 | Status: SHIPPED | OUTPATIENT
Start: 2024-11-08

## 2024-11-12 ENCOUNTER — HOSPITAL ENCOUNTER (OUTPATIENT)
Dept: WOMENS IMAGING | Age: 34
Discharge: HOME | End: 2024-11-12
Payer: COMMERCIAL

## 2024-11-12 VITALS — WEIGHT: 136.69 LBS | HEIGHT: 65 IN | BODY MASS INDEX: 22.77 KG/M2

## 2024-11-12 DIAGNOSIS — R92.8 ABNORMAL MAMMOGRAM: ICD-10-CM

## 2024-11-12 DIAGNOSIS — N63.0 MASS OF BREAST, UNSPECIFIED LATERALITY: Primary | ICD-10-CM

## 2024-11-12 DIAGNOSIS — N63.10 MASS OF MULTIPLE SITES OF RIGHT BREAST: ICD-10-CM

## 2024-11-12 PROCEDURE — 76642 ULTRASOUND BREAST LIMITED: CPT

## 2024-11-12 PROCEDURE — G0279 TOMOSYNTHESIS, MAMMO: HCPCS

## 2025-01-30 ENCOUNTER — OFFICE VISIT (OUTPATIENT)
Dept: FAMILY MEDICINE CLINIC | Age: 35
End: 2025-01-30
Payer: COMMERCIAL

## 2025-01-30 VITALS — OXYGEN SATURATION: 96 % | SYSTOLIC BLOOD PRESSURE: 100 MMHG | DIASTOLIC BLOOD PRESSURE: 68 MMHG | HEART RATE: 68 BPM

## 2025-01-30 DIAGNOSIS — J30.89 SEASONAL ALLERGIC RHINITIS DUE TO OTHER ALLERGIC TRIGGER: ICD-10-CM

## 2025-01-30 DIAGNOSIS — G44.89 OTHER HEADACHE SYNDROME: Primary | ICD-10-CM

## 2025-01-30 DIAGNOSIS — E55.9 VITAMIN D DEFICIENCY: ICD-10-CM

## 2025-01-30 DIAGNOSIS — G44.209 TENSION HEADACHE: ICD-10-CM

## 2025-01-30 DIAGNOSIS — G44.89 OTHER HEADACHE SYNDROME: ICD-10-CM

## 2025-01-30 LAB
25(OH)D3 SERPL-MCNC: 33.8 NG/ML
ALBUMIN SERPL-MCNC: 4.4 G/DL (ref 3.4–5)
ALBUMIN/GLOB SERPL: 1.9 {RATIO} (ref 1.1–2.2)
ALP SERPL-CCNC: 63 U/L (ref 40–129)
ALT SERPL-CCNC: 19 U/L (ref 10–40)
ANION GAP SERPL CALCULATED.3IONS-SCNC: 9 MMOL/L (ref 3–16)
AST SERPL-CCNC: 19 U/L (ref 15–37)
BASOPHILS # BLD: 0 K/UL (ref 0–0.2)
BASOPHILS NFR BLD: 0.7 %
BILIRUB SERPL-MCNC: 0.3 MG/DL (ref 0–1)
BUN SERPL-MCNC: 17 MG/DL (ref 7–20)
CALCIUM SERPL-MCNC: 9.5 MG/DL (ref 8.3–10.6)
CHLORIDE SERPL-SCNC: 106 MMOL/L (ref 99–110)
CO2 SERPL-SCNC: 24 MMOL/L (ref 21–32)
CREAT SERPL-MCNC: 0.7 MG/DL (ref 0.6–1.1)
DEPRECATED RDW RBC AUTO: 13.3 % (ref 12.4–15.4)
EOSINOPHIL # BLD: 0.4 K/UL (ref 0–0.6)
EOSINOPHIL NFR BLD: 6.1 %
GFR SERPLBLD CREATININE-BSD FMLA CKD-EPI: >90 ML/MIN/{1.73_M2}
GLUCOSE SERPL-MCNC: 78 MG/DL (ref 70–99)
HCT VFR BLD AUTO: 38.6 % (ref 36–48)
HGB BLD-MCNC: 13 G/DL (ref 12–16)
LYMPHOCYTES # BLD: 1.5 K/UL (ref 1–5.1)
LYMPHOCYTES NFR BLD: 20.7 %
MCH RBC QN AUTO: 29.5 PG (ref 26–34)
MCHC RBC AUTO-ENTMCNC: 33.6 G/DL (ref 31–36)
MCV RBC AUTO: 87.8 FL (ref 80–100)
MONOCYTES # BLD: 0.6 K/UL (ref 0–1.3)
MONOCYTES NFR BLD: 7.9 %
NEUTROPHILS # BLD: 4.7 K/UL (ref 1.7–7.7)
NEUTROPHILS NFR BLD: 64.6 %
PLATELET # BLD AUTO: 296 K/UL (ref 135–450)
PMV BLD AUTO: 10.1 FL (ref 5–10.5)
POTASSIUM SERPL-SCNC: 4.8 MMOL/L (ref 3.5–5.1)
PROT SERPL-MCNC: 6.7 G/DL (ref 6.4–8.2)
RBC # BLD AUTO: 4.39 M/UL (ref 4–5.2)
SODIUM SERPL-SCNC: 139 MMOL/L (ref 136–145)
TSH SERPL DL<=0.005 MIU/L-ACNC: 1.15 UIU/ML (ref 0.27–4.2)
WBC # BLD AUTO: 7.3 K/UL (ref 4–11)

## 2025-01-30 PROCEDURE — G8420 CALC BMI NORM PARAMETERS: HCPCS | Performed by: REGISTERED NURSE

## 2025-01-30 PROCEDURE — 99214 OFFICE O/P EST MOD 30 MIN: CPT | Performed by: REGISTERED NURSE

## 2025-01-30 PROCEDURE — 96372 THER/PROPH/DIAG INJ SC/IM: CPT | Performed by: REGISTERED NURSE

## 2025-01-30 PROCEDURE — 1036F TOBACCO NON-USER: CPT | Performed by: REGISTERED NURSE

## 2025-01-30 PROCEDURE — G8427 DOCREV CUR MEDS BY ELIG CLIN: HCPCS | Performed by: REGISTERED NURSE

## 2025-01-30 RX ORDER — LORATADINE 10 MG/1
TABLET ORAL
Qty: 90 TABLET | Refills: 3 | Status: SHIPPED | OUTPATIENT
Start: 2025-01-30

## 2025-01-30 RX ORDER — SUMATRIPTAN SUCCINATE 25 MG/1
25 TABLET ORAL PRN
Qty: 1 TABLET | Refills: 0 | Status: SHIPPED | OUTPATIENT
Start: 2025-01-30

## 2025-01-30 RX ORDER — KETOROLAC TROMETHAMINE 30 MG/ML
60 INJECTION, SOLUTION INTRAMUSCULAR; INTRAVENOUS ONCE
Status: COMPLETED | OUTPATIENT
Start: 2025-01-30 | End: 2025-01-30

## 2025-01-30 RX ADMIN — KETOROLAC TROMETHAMINE 60 MG: 30 INJECTION, SOLUTION INTRAMUSCULAR; INTRAVENOUS at 11:28

## 2025-01-30 SDOH — ECONOMIC STABILITY: FOOD INSECURITY: WITHIN THE PAST 12 MONTHS, YOU WORRIED THAT YOUR FOOD WOULD RUN OUT BEFORE YOU GOT MONEY TO BUY MORE.: NEVER TRUE

## 2025-01-30 SDOH — ECONOMIC STABILITY: FOOD INSECURITY: WITHIN THE PAST 12 MONTHS, THE FOOD YOU BOUGHT JUST DIDN'T LAST AND YOU DIDN'T HAVE MONEY TO GET MORE.: NEVER TRUE

## 2025-01-30 ASSESSMENT — ENCOUNTER SYMPTOMS
RESPIRATORY NEGATIVE: 1
NAUSEA: 1
COUGH: 0
WHEEZING: 0
SHORTNESS OF BREATH: 0
VOMITING: 0
EYES NEGATIVE: 1
RHINORRHEA: 0
SINUS PRESSURE: 0
SINUS PAIN: 0
SORE THROAT: 0

## 2025-01-30 NOTE — PROGRESS NOTES
Patient: Amber Valdez is a 35 y.o. female who presents today with the following Chief Complaint(s):  Chief Complaint   Patient presents with    Headache       Assessment/Plan:    Assessment & Plan  1. Headache.  The neurological examination did not reveal any abnormalities. The headache could be due to tension-type headache or migraine, or a combination of both. The headache is unlikely to be related to the fall she experienced 6 years ago, as she was evaluated at that time and no issues were found. Potential causes of the headache, including hormonal shifts, stress levels, muscular tension, and positional factors such as frequent driving, were discussed. Blood work will be ordered to ensure there are no underlying issues.     Toradol will be given today and she is advised against taking ibuprofen today. She is also advised to take triptans at home if the headache persists, ensuring she is in a safe environment as these medications can induce fatigue.     Adequate hydration and a healthy diet are recommended. A referral for a massage at [facility] will be provided, and she is encouraged to continue her morning exercises. Information on tension headaches and migraines will be provided. She is also advised to consider taking B12, magnesium, riboflavin, and CoQ10 supplements, which can help prevent headaches, particularly migraines. If the current treatment plan does not alleviate her symptoms, she is instructed to contact the office.    Reviewed signs and symptoms to monitor for when to go to ED-these were included in AVS.    2. Allergy to COFFEE.  She has a known allergy to COFFEE, which exacerbates her symptoms, but she continues to consume it due to personal preference.    3. Birth control.  She is currently using Nexplanon for birth control and reports no changes in her menstrual pattern.    4. Low vitamin D.  She is currently taking weekly vitamin D supplements but reports no noticeable improvement in her

## 2025-01-30 NOTE — PATIENT INSTRUCTIONS
Call me if headache does not improve.     If you have any dizziness, worsening headache, vision changes, difficulty speaking or thinking- please go to the hospital.

## 2025-02-03 DIAGNOSIS — G44.89 OTHER HEADACHE SYNDROME: ICD-10-CM

## 2025-02-03 RX ORDER — SUMATRIPTAN SUCCINATE 25 MG/1
25 TABLET ORAL PRN
Qty: 1 TABLET | Refills: 0 | Status: SHIPPED | OUTPATIENT
Start: 2025-02-03

## 2025-05-08 ENCOUNTER — RESULTS FOLLOW-UP (OUTPATIENT)
Dept: FAMILY MEDICINE CLINIC | Age: 35
End: 2025-05-08

## 2025-05-08 ENCOUNTER — HOSPITAL ENCOUNTER (OUTPATIENT)
Dept: WOMENS IMAGING | Age: 35
Discharge: HOME | End: 2025-05-08
Payer: COMMERCIAL

## 2025-05-08 VITALS — HEIGHT: 65 IN | BODY MASS INDEX: 22.77 KG/M2 | WEIGHT: 136.69 LBS

## 2025-05-08 DIAGNOSIS — N63.20 MASSES OF BOTH BREASTS: Primary | ICD-10-CM

## 2025-05-08 DIAGNOSIS — R92.8 ABNORMAL MAMMOGRAM: ICD-10-CM

## 2025-05-08 DIAGNOSIS — N63.0 MASS OF BREAST, UNSPECIFIED LATERALITY: ICD-10-CM

## 2025-05-08 DIAGNOSIS — N63.10 MASSES OF BOTH BREASTS: Primary | ICD-10-CM

## 2025-05-08 PROCEDURE — 76642 ULTRASOUND BREAST LIMITED: CPT

## 2025-05-08 PROCEDURE — G0279 TOMOSYNTHESIS, MAMMO: HCPCS
